# Patient Record
Sex: FEMALE | Race: WHITE | Employment: FULL TIME | URBAN - METROPOLITAN AREA
[De-identification: names, ages, dates, MRNs, and addresses within clinical notes are randomized per-mention and may not be internally consistent; named-entity substitution may affect disease eponyms.]

---

## 2018-01-22 ENCOUNTER — HOSPITAL ENCOUNTER (OUTPATIENT)
Dept: MAMMOGRAPHY | Age: 63
Discharge: HOME | End: 2018-01-22
Attending: FAMILY MEDICINE
Payer: COMMERCIAL

## 2018-01-22 DIAGNOSIS — Z12.39 SCREENING BREAST EXAMINATION: ICD-10-CM

## 2018-01-22 PROCEDURE — 77067 SCR MAMMO BI INCL CAD: CPT

## 2019-02-21 ENCOUNTER — HOSPITAL ENCOUNTER (OUTPATIENT)
Dept: MAMMOGRAPHY | Age: 64
Discharge: HOME | End: 2019-02-21
Attending: FAMILY MEDICINE
Payer: COMMERCIAL

## 2019-02-21 DIAGNOSIS — Z12.39 SCREENING BREAST EXAMINATION: ICD-10-CM

## 2019-02-21 PROCEDURE — 77067 SCR MAMMO BI INCL CAD: CPT

## 2020-12-19 ENCOUNTER — APPOINTMENT (OUTPATIENT)
Dept: GENERAL RADIOLOGY | Age: 65
End: 2020-12-19
Attending: EMERGENCY MEDICINE
Payer: COMMERCIAL

## 2020-12-19 ENCOUNTER — HOSPITAL ENCOUNTER (EMERGENCY)
Age: 65
Discharge: HOME | End: 2020-12-19
Attending: EMERGENCY MEDICINE
Payer: COMMERCIAL

## 2020-12-19 VITALS
OXYGEN SATURATION: 97 % | DIASTOLIC BLOOD PRESSURE: 76 MMHG | BODY MASS INDEX: 38.87 KG/M2 | HEIGHT: 60 IN | WEIGHT: 197.97 LBS | TEMPERATURE: 98 F | HEART RATE: 74 BPM | RESPIRATION RATE: 18 BRPM | SYSTOLIC BLOOD PRESSURE: 131 MMHG

## 2020-12-19 DIAGNOSIS — S80.11XA CONTUSION OF RIGHT LOWER LEG, INITIAL ENCOUNTER: Primary | ICD-10-CM

## 2020-12-19 PROCEDURE — 99283 EMERGENCY DEPT VISIT LOW MDM: CPT

## 2020-12-19 PROCEDURE — 73590 X-RAY EXAM OF LOWER LEG: CPT

## 2020-12-19 NOTE — ED PROVIDER NOTES
EMERGENCY DEPARTMENT HISTORY AND PHYSICAL EXAM      Date: 12/19/2020  Patient Name: Uvaldo Hamman    History of Presenting Illness     Chief Complaint   Patient presents with    Leg Injury     pt states one week ago she had a headboard of a bed fall on her RLE. Pt c/o large hematoma to lateral and posterior RLE. Pt has significant bruising and moderate swelling to RLE         HPI: Uvaldo Hamman, 60 y.o. female presents to the ED with cc of right lower extremity bruising and pain. She states that about a week ago she hit this on the headboard, since then she has developed bruising and swelling. It has not gotten better. She has been ambulating normally with no pain. She is primarily concerned about a DVT. There are no other complaints, changes, or physical findings at this time. PCP: Tara Wing MD    No current facility-administered medications on file prior to encounter. No current outpatient medications on file prior to encounter. Past History     Past Medical History:  History reviewed. No pertinent past medical history. Past Surgical History:  Past Surgical History:   Procedure Laterality Date    HX CHOLECYSTECTOMY      HX MASTECTOMY         Family History:  History reviewed. No pertinent family history. Social History:  Social History     Tobacco Use    Smoking status: Never Smoker    Smokeless tobacco: Never Used   Substance Use Topics    Alcohol use: Never     Frequency: Never    Drug use: Never       Allergies: Allergies   Allergen Reactions    Bactrim [Sulfamethoprim] Rash    Codeine Nausea and Vomiting    Pcn [Penicillins] Rash         Review of Systems   Review of Systems   Constitutional: Negative for chills and fatigue. Respiratory: Negative for cough and shortness of breath. Cardiovascular: Negative for chest pain. Musculoskeletal: Negative for gait problem. Neurological: Negative for weakness and numbness.    All other systems reviewed and are negative. Physical Exam   Physical Exam  Vitals signs and nursing note reviewed. Constitutional:       Appearance: Normal appearance. Musculoskeletal:      Comments: RLE with bruising distally to medial side sup to achilles and lateral to tibia extending posteriorly, approx 5 in diameter. No erythema/warmth. Underlying induration noted. Achilles intact w/o pain. No bony knee/ankle/foot pain. 2+ DP/PT pulses. SILT, toes up/down. Neurological:      Mental Status: She is alert. Diagnostic Study Results     Labs -   No results found for this or any previous visit (from the past 24 hour(s)). Radiologic Studies -   XR TIB/FIB RT   Final Result   IMPRESSION: Posterior soft tissue edema without acute fracture. CT Results  (Last 48 hours)    None        CXR Results  (Last 48 hours)    None            Medical Decision Making   I am the first provider for this patient. I reviewed the vital signs, available nursing notes, past medical history, past surgical history, family history and social history. Vital Signs-Reviewed the patient's vital signs. Patient Vitals for the past 24 hrs:   Temp Pulse Resp BP SpO2   12/19/20 1200    131/76 97 %   12/19/20 1122     97 %   12/19/20 1114 98 °F (36.7 °C) 74 18 (!) 145/84 100 %         Provider Notes (Medical Decision Making):   Very pleasant, well-appearing 67-year-old presenting to ED about a week after blunt trauma to the posteromedial aspect superior to the ankle. Most likely etiology is bruising with underlying hematoma. There is no overlying warmth or erythema suggestive of cellulitis. There is no circumferential swelling suggestive of a DVT. No bony tenderness. X-ray done and no fractures, tumors or other abnormalities noted. She is neurovascularly intact and ambulating well. I think she safe for discharge at this time. Discussed symptomatic management and follow-up with primary care for further evaluation.   Return precautions given.    ED Course:     Initial assessment performed. The patients presenting problems have been discussed, and they are in agreement with the care plan formulated and outlined with them. I have encouraged them to ask questions as they arise throughout their visit. Disposition:  dc    PLAN:  1. There are no discharge medications for this patient.     2.   Follow-up Information    None       Return to ED if worse     Diagnosis     Clinical Impression: contusion

## 2020-12-19 NOTE — ED NOTES
Dr. Gail Galeazzi at bedside evaluating pt. Pt resting on stretcher in POC with call bell in reach. Pt remains on monitor x 2. VSS at this time. 1239: Pt discharged by Dr. Gail Galeazzi. Pt provided with discharge instructions Rx and instructions on follow up care. Pt out of ED ambulatory.

## 2021-03-18 ENCOUNTER — TRANSCRIBE ORDER (OUTPATIENT)
Dept: SCHEDULING | Age: 66
End: 2021-03-18

## 2021-03-18 DIAGNOSIS — R91.8 MULTIPLE PULMONARY NODULES: Primary | ICD-10-CM

## 2021-05-08 ENCOUNTER — HOSPITAL ENCOUNTER (EMERGENCY)
Age: 66
Discharge: HOME | End: 2021-05-08
Attending: EMERGENCY MEDICINE
Payer: COMMERCIAL

## 2021-05-08 ENCOUNTER — APPOINTMENT (OUTPATIENT)
Dept: GENERAL RADIOLOGY | Age: 66
End: 2021-05-08
Attending: EMERGENCY MEDICINE
Payer: COMMERCIAL

## 2021-05-08 VITALS
DIASTOLIC BLOOD PRESSURE: 71 MMHG | WEIGHT: 205 LBS | SYSTOLIC BLOOD PRESSURE: 127 MMHG | BODY MASS INDEX: 40.25 KG/M2 | RESPIRATION RATE: 17 BRPM | HEART RATE: 73 BPM | TEMPERATURE: 98.6 F | HEIGHT: 60 IN | OXYGEN SATURATION: 96 %

## 2021-05-08 DIAGNOSIS — R07.9 CHEST PAIN, UNSPECIFIED TYPE: Primary | ICD-10-CM

## 2021-05-08 LAB
ALBUMIN SERPL-MCNC: 4 G/DL (ref 3.5–5)
ALBUMIN/GLOB SERPL: 1.1 {RATIO} (ref 1.1–2.2)
ALP SERPL-CCNC: 94 U/L (ref 45–117)
ALT SERPL-CCNC: 28 U/L (ref 12–78)
ANION GAP SERPL CALC-SCNC: 5 MMOL/L (ref 5–15)
AST SERPL-CCNC: 9 U/L (ref 15–37)
BASOPHILS # BLD: 0 K/UL (ref 0–0.1)
BASOPHILS NFR BLD: 1 % (ref 0–1)
BILIRUB SERPL-MCNC: 0.3 MG/DL (ref 0.2–1)
BUN SERPL-MCNC: 19 MG/DL (ref 6–20)
BUN/CREAT SERPL: 19 (ref 12–20)
CALCIUM SERPL-MCNC: 9.6 MG/DL (ref 8.5–10.1)
CHLORIDE SERPL-SCNC: 108 MMOL/L (ref 97–108)
CO2 SERPL-SCNC: 27 MMOL/L (ref 21–32)
CREAT SERPL-MCNC: 0.98 MG/DL (ref 0.55–1.02)
D DIMER PPP FEU-MCNC: 0.49 MG/L FEU (ref 0–0.65)
DIFFERENTIAL METHOD BLD: NORMAL
EOSINOPHIL # BLD: 0.2 K/UL (ref 0–0.4)
EOSINOPHIL NFR BLD: 3 % (ref 0–7)
ERYTHROCYTE [DISTWIDTH] IN BLOOD BY AUTOMATED COUNT: 12.9 % (ref 11.5–14.5)
GLOBULIN SER CALC-MCNC: 3.6 G/DL (ref 2–4)
GLUCOSE SERPL-MCNC: 115 MG/DL (ref 65–100)
HCT VFR BLD AUTO: 45.3 % (ref 35–47)
HGB BLD-MCNC: 14.3 G/DL (ref 11.5–16)
IMM GRANULOCYTES # BLD AUTO: 0 K/UL (ref 0–0.04)
IMM GRANULOCYTES NFR BLD AUTO: 0 % (ref 0–0.5)
LYMPHOCYTES # BLD: 1.5 K/UL (ref 0.8–3.5)
LYMPHOCYTES NFR BLD: 21 % (ref 12–49)
MCH RBC QN AUTO: 29.2 PG (ref 26–34)
MCHC RBC AUTO-ENTMCNC: 31.6 G/DL (ref 30–36.5)
MCV RBC AUTO: 92.4 FL (ref 80–99)
MONOCYTES # BLD: 0.4 K/UL (ref 0–1)
MONOCYTES NFR BLD: 6 % (ref 5–13)
NEUTS SEG # BLD: 5.1 K/UL (ref 1.8–8)
NEUTS SEG NFR BLD: 69 % (ref 32–75)
NRBC # BLD: 0 K/UL (ref 0–0.01)
NRBC BLD-RTO: 0 PER 100 WBC
PLATELET # BLD AUTO: 397 K/UL (ref 150–400)
PMV BLD AUTO: 9.4 FL (ref 8.9–12.9)
POTASSIUM SERPL-SCNC: 3.6 MMOL/L (ref 3.5–5.1)
PROT SERPL-MCNC: 7.6 G/DL (ref 6.4–8.2)
RBC # BLD AUTO: 4.9 M/UL (ref 3.8–5.2)
SODIUM SERPL-SCNC: 140 MMOL/L (ref 136–145)
TROPONIN I SERPL-MCNC: <0.05 NG/ML
TROPONIN I SERPL-MCNC: <0.05 NG/ML
WBC # BLD AUTO: 7.3 K/UL (ref 3.6–11)

## 2021-05-08 PROCEDURE — 85025 COMPLETE CBC W/AUTO DIFF WBC: CPT

## 2021-05-08 PROCEDURE — 99285 EMERGENCY DEPT VISIT HI MDM: CPT

## 2021-05-08 PROCEDURE — 80053 COMPREHEN METABOLIC PANEL: CPT

## 2021-05-08 PROCEDURE — 93005 ELECTROCARDIOGRAM TRACING: CPT

## 2021-05-08 PROCEDURE — 84484 ASSAY OF TROPONIN QUANT: CPT

## 2021-05-08 PROCEDURE — 71045 X-RAY EXAM CHEST 1 VIEW: CPT

## 2021-05-08 PROCEDURE — 36415 COLL VENOUS BLD VENIPUNCTURE: CPT

## 2021-05-08 PROCEDURE — 85379 FIBRIN DEGRADATION QUANT: CPT

## 2021-05-09 LAB
ATRIAL RATE: 87 BPM
CALCULATED P AXIS, ECG09: 22 DEGREES
CALCULATED R AXIS, ECG10: -8 DEGREES
CALCULATED T AXIS, ECG11: 5 DEGREES
DIAGNOSIS, 93000: NORMAL
P-R INTERVAL, ECG05: 142 MS
Q-T INTERVAL, ECG07: 392 MS
QRS DURATION, ECG06: 134 MS
QTC CALCULATION (BEZET), ECG08: 471 MS
VENTRICULAR RATE, ECG03: 87 BPM

## 2021-05-09 NOTE — ED NOTES
Bedside shift change report given to [id number] (oncoming nurse) by Meghna Fung (offgoing nurse). Report included the following information SBAR, Kardex, ED Summary, Intake/Output, MAR, Recent Results and Med Rec Status. 1930- repeat trop drawn. 2017- I have reviewed discharge instructions with the patient. The patient verbalized understanding. Pt ambulatory.

## 2021-05-09 NOTE — ED PROVIDER NOTES
EMERGENCY DEPARTMENT HISTORY AND PHYSICAL EXAM      Date: 5/8/2021  Patient Name: Sanna Wiley    History of Presenting Illness     Chief Complaint   Patient presents with    Chest Pain     Pain at left chest intermittent for 1 week, worse today with some SOB and dizziness. HPI: Sanna Wiley, 60 y.o. female presents to the ED with cc of chest pain. Ongoing for some time. Normally only has it when she is at work. L sided w radiation to LUE. Not assoc w exertion or rest, she thinks it's assoc w stress. She came in because she wasn't at work today but had the pain. No pain currently. There are no other complaints, changes, or physical findings at this time. PCP: Anastasiia Waters MD    No current facility-administered medications on file prior to encounter. No current outpatient medications on file prior to encounter. Past History     Past Medical History:  No past medical history on file. Past Surgical History:  Past Surgical History:   Procedure Laterality Date    HX CHOLECYSTECTOMY      HX MASTECTOMY         Family History:  No family history on file. Social History:  Social History     Tobacco Use    Smoking status: Never Smoker    Smokeless tobacco: Never Used   Substance Use Topics    Alcohol use: Never     Frequency: Never    Drug use: Never       Allergies: Allergies   Allergen Reactions    Bactrim [Sulfamethoprim] Rash    Codeine Nausea and Vomiting    Pcn [Penicillins] Rash         Review of Systems   Review of Systems   Constitutional: Negative for chills and fever. HENT: Negative for rhinorrhea. Eyes: Negative for redness. Respiratory: Negative for shortness of breath. Cardiovascular: Positive for chest pain. Gastrointestinal: Negative for abdominal pain. Genitourinary: Negative for dysuria. Musculoskeletal: Negative for back pain. Neurological: Negative for syncope. Psychiatric/Behavioral: The patient is not nervous/anxious.     All other systems reviewed and are negative. Physical Exam   Physical Exam  Vitals signs and nursing note reviewed. Constitutional:       Appearance: Normal appearance. HENT:      Head: Normocephalic and atraumatic. Mouth/Throat:      Mouth: Mucous membranes are moist.   Eyes:      Extraocular Movements: Extraocular movements intact. Neck:      Musculoskeletal: Neck supple. Cardiovascular:      Rate and Rhythm: Normal rate and regular rhythm. Pulses: Normal pulses. Pulmonary:      Effort: Pulmonary effort is normal.      Breath sounds: Normal breath sounds. Abdominal:      Palpations: Abdomen is soft. Tenderness: There is no abdominal tenderness. Musculoskeletal:         General: No deformity. Skin:     General: Skin is warm and dry. Neurological:      General: No focal deficit present. Mental Status: She is alert.    Psychiatric:         Mood and Affect: Mood normal.         Behavior: Behavior normal.         Diagnostic Study Results     Labs -     Recent Results (from the past 24 hour(s))   EKG, 12 LEAD, INITIAL    Collection Time: 05/08/21  4:21 PM   Result Value Ref Range    Ventricular Rate 87 BPM    Atrial Rate 87 BPM    P-R Interval 142 ms    QRS Duration 134 ms    Q-T Interval 392 ms    QTC Calculation (Bezet) 471 ms    Calculated P Axis 22 degrees    Calculated R Axis -8 degrees    Calculated T Axis 5 degrees    Diagnosis       Normal sinus rhythm  Right bundle branch block  When compared with ECG of 06-APR-2002 18:26,  Right bundle branch block is now present     CBC WITH AUTOMATED DIFF    Collection Time: 05/08/21  4:31 PM   Result Value Ref Range    WBC 7.3 3.6 - 11.0 K/uL    RBC 4.90 3.80 - 5.20 M/uL    HGB 14.3 11.5 - 16.0 g/dL    HCT 45.3 35.0 - 47.0 %    MCV 92.4 80.0 - 99.0 FL    MCH 29.2 26.0 - 34.0 PG    MCHC 31.6 30.0 - 36.5 g/dL    RDW 12.9 11.5 - 14.5 %    PLATELET 971 179 - 365 K/uL    MPV 9.4 8.9 - 12.9 FL    NRBC 0.0 0  WBC    ABSOLUTE NRBC 0.00 0.00 - 0.01 K/uL    NEUTROPHILS 69 32 - 75 %    LYMPHOCYTES 21 12 - 49 %    MONOCYTES 6 5 - 13 %    EOSINOPHILS 3 0 - 7 %    BASOPHILS 1 0 - 1 %    IMMATURE GRANULOCYTES 0 0.0 - 0.5 %    ABS. NEUTROPHILS 5.1 1.8 - 8.0 K/UL    ABS. LYMPHOCYTES 1.5 0.8 - 3.5 K/UL    ABS. MONOCYTES 0.4 0.0 - 1.0 K/UL    ABS. EOSINOPHILS 0.2 0.0 - 0.4 K/UL    ABS. BASOPHILS 0.0 0.0 - 0.1 K/UL    ABS. IMM. GRANS. 0.0 0.00 - 0.04 K/UL    DF AUTOMATED     METABOLIC PANEL, COMPREHENSIVE    Collection Time: 05/08/21  4:31 PM   Result Value Ref Range    Sodium 140 136 - 145 mmol/L    Potassium 3.6 3.5 - 5.1 mmol/L    Chloride 108 97 - 108 mmol/L    CO2 27 21 - 32 mmol/L    Anion gap 5 5 - 15 mmol/L    Glucose 115 (H) 65 - 100 mg/dL    BUN 19 6 - 20 MG/DL    Creatinine 0.98 0.55 - 1.02 MG/DL    BUN/Creatinine ratio 19 12 - 20      GFR est AA >60 >60 ml/min/1.73m2    GFR est non-AA 57 (L) >60 ml/min/1.73m2    Calcium 9.6 8.5 - 10.1 MG/DL    Bilirubin, total 0.3 0.2 - 1.0 MG/DL    ALT (SGPT) 28 12 - 78 U/L    AST (SGOT) 9 (L) 15 - 37 U/L    Alk. phosphatase 94 45 - 117 U/L    Protein, total 7.6 6.4 - 8.2 g/dL    Albumin 4.0 3.5 - 5.0 g/dL    Globulin 3.6 2.0 - 4.0 g/dL    A-G Ratio 1.1 1.1 - 2.2     TROPONIN I    Collection Time: 05/08/21  4:31 PM   Result Value Ref Range    Troponin-I, Qt. <0.05 <0.05 ng/mL   D DIMER    Collection Time: 05/08/21  5:30 PM   Result Value Ref Range    D-dimer 0.49 0.00 - 0.65 mg/L FEU   TROPONIN I    Collection Time: 05/08/21  7:25 PM   Result Value Ref Range    Troponin-I, Qt. <0.05 <0.05 ng/mL       Radiologic Studies -   XR CHEST PORT   Final Result   No acute findings        CT Results  (Last 48 hours)    None        CXR Results  (Last 48 hours)               05/08/21 1648  XR CHEST PORT Final result    Impression:  No acute findings       Narrative:  EXAM: XR CHEST PORT       INDICATION: chest pain       COMPARISON: None. FINDINGS: A portable AP radiograph of the chest was obtained at 1635 hours. There is no pneumothorax or pleural effusion. The lungs are clear. The cardiac   and mediastinal contours and pulmonary vascularity are normal. No hilar   enlargement is shown. The bones and soft tissues are grossly within normal   limits. Medical Decision Making   I am the first provider for this patient. I reviewed the vital signs, available nursing notes, past medical history, past surgical history, family history and social history. Vital Signs-Reviewed the patient's vital signs. Patient Vitals for the past 24 hrs:   Temp Pulse Resp BP SpO2   05/08/21 1845  73 17 127/71 96 %   05/08/21 1830  71 18 127/63 96 %   05/08/21 1815  75 15 (!) 150/73 95 %   05/08/21 1730  80 20 (!) 157/74 95 %   05/08/21 1715  80 13 (!) 150/63 94 %   05/08/21 1700  87 12 (!) 156/93 96 %   05/08/21 1645  81 17 (!) 178/82 96 %   05/08/21 1643     98 %   05/08/21 1638 98.6 °F (37 °C) 86 20 (!) 162/78 97 %         Provider Notes (Medical Decision Making):   Pleasant 61 yo lady pw L sided CP w radiation to LUE. EKG w no ischemia and trops neg x 2, does not sound like ACS. Dimer neg, PE unlikely. CXR clear w no pnm or PTX. Labs otherwise reassuring. Ultimately, I think pt is safe for dc at this time w plan to follow up with cardiology for further evaluation. Close return precautions given. EKG sinus, rate normal, nonspecific ST T changes, normal QT    ED Course:     Initial assessment performed. The patients presenting problems have been discussed, and they are in agreement with the care plan formulated and outlined with them. I have encouraged them to ask questions as they arise throughout their visit. Disposition:  dc    PLAN:  1. There are no discharge medications for this patient.     2.   Follow-up Information     Follow up With Specialties Details Why Contact Info    Christy Jackson MD Cardiology   [place] [address]  [address]  [phone]          Return to ED if worse Diagnosis     Clinical Impression: chest pain

## 2021-05-11 ENCOUNTER — HOSPITAL ENCOUNTER (OUTPATIENT)
Dept: CT IMAGING | Age: 66
Discharge: HOME | End: 2021-05-11
Attending: INTERNAL MEDICINE
Payer: COMMERCIAL

## 2021-05-11 DIAGNOSIS — R91.8 MULTIPLE PULMONARY NODULES: ICD-10-CM

## 2021-05-11 PROCEDURE — 71250 CT THORAX DX C-: CPT

## 2021-08-02 ENCOUNTER — HOSPITAL ENCOUNTER (OUTPATIENT)
Dept: GENERAL RADIOLOGY | Age: 66
Discharge: HOME | End: 2021-08-02
Payer: COMMERCIAL

## 2021-08-02 ENCOUNTER — TRANSCRIBE ORDER (OUTPATIENT)
Dept: REGISTRATION | Age: 66
End: 2021-08-02

## 2021-08-02 DIAGNOSIS — R91.8 LUNG MASS: Primary | ICD-10-CM

## 2021-08-02 DIAGNOSIS — R91.8 LUNG MASS: ICD-10-CM

## 2021-08-02 PROCEDURE — 71046 X-RAY EXAM CHEST 2 VIEWS: CPT

## 2021-08-12 ENCOUNTER — TRANSCRIBE ORDER (OUTPATIENT)
Dept: SCHEDULING | Age: 66
End: 2021-08-12

## 2021-08-12 DIAGNOSIS — R91.8 MULTIPLE PULMONARY NODULES: Primary | ICD-10-CM

## 2022-02-23 ENCOUNTER — HOSPITAL ENCOUNTER (OUTPATIENT)
Dept: CT IMAGING | Age: 67
Discharge: HOME | End: 2022-02-23
Attending: INTERNAL MEDICINE
Payer: COMMERCIAL

## 2022-02-23 PROCEDURE — 71250 CT THORAX DX C-: CPT

## 2023-01-25 ENCOUNTER — HOSPITAL ENCOUNTER (OUTPATIENT)
Dept: GENERAL RADIOLOGY | Age: 68
Discharge: HOME | End: 2023-01-25
Payer: COMMERCIAL

## 2023-01-25 ENCOUNTER — TRANSCRIBE ORDER (OUTPATIENT)
Dept: REGISTRATION | Age: 68
End: 2023-01-25

## 2023-01-25 DIAGNOSIS — Z98.1 STATUS POST LUMBAR SPINAL FUSION: ICD-10-CM

## 2023-01-25 DIAGNOSIS — Z98.1 STATUS POST LUMBAR SPINAL FUSION: Primary | ICD-10-CM

## 2023-01-25 PROCEDURE — 72100 X-RAY EXAM L-S SPINE 2/3 VWS: CPT

## 2023-04-19 ENCOUNTER — TRANSCRIBE ORDER (OUTPATIENT)
Dept: REGISTRATION | Age: 68
End: 2023-04-19

## 2023-04-19 ENCOUNTER — HOSPITAL ENCOUNTER (OUTPATIENT)
Dept: GENERAL RADIOLOGY | Age: 68
Discharge: HOME | End: 2023-04-19
Payer: COMMERCIAL

## 2023-04-19 DIAGNOSIS — Z98.1 STATUS POST LUMBAR SPINAL FUSION: ICD-10-CM

## 2023-04-19 DIAGNOSIS — Z98.1 STATUS POST LUMBAR SPINAL FUSION: Primary | ICD-10-CM

## 2023-04-19 PROCEDURE — 72110 X-RAY EXAM L-2 SPINE 4/>VWS: CPT

## 2023-04-23 DIAGNOSIS — Z98.1 STATUS POST LUMBAR SPINAL FUSION: Primary | ICD-10-CM

## 2023-08-16 ENCOUNTER — HOSPITAL ENCOUNTER (OUTPATIENT)
Facility: HOSPITAL | Age: 68
Discharge: HOME | End: 2023-08-19
Payer: MEDICARE

## 2023-08-16 DIAGNOSIS — Z98.1 STATUS POST LUMBAR SPINAL FUSION: ICD-10-CM

## 2023-08-16 PROCEDURE — 72110 X-RAY EXAM L-2 SPINE 4/>VWS: CPT

## 2024-08-09 ENCOUNTER — APPOINTMENT (OUTPATIENT)
Facility: HOSPITAL | Age: 69
DRG: 149 | End: 2024-08-09
Payer: MEDICARE

## 2024-08-09 ENCOUNTER — HOSPITAL ENCOUNTER (INPATIENT)
Facility: HOSPITAL | Age: 69
LOS: 10 days | Discharge: HOME HEALTH | DRG: 149 | End: 2024-08-19
Attending: EMERGENCY MEDICINE | Admitting: STUDENT IN AN ORGANIZED HEALTH CARE EDUCATION/TRAINING PROGRAM
Payer: MEDICARE

## 2024-08-09 DIAGNOSIS — R42 DIZZINESS: Primary | ICD-10-CM

## 2024-08-09 DIAGNOSIS — R07.9 CHEST PAIN, UNSPECIFIED TYPE: ICD-10-CM

## 2024-08-09 LAB
ALBUMIN SERPL-MCNC: 3.8 G/DL (ref 3.5–5)
ALBUMIN/GLOB SERPL: 1.3 (ref 1.1–2.2)
ALP SERPL-CCNC: 74 U/L (ref 45–117)
ALT SERPL-CCNC: 23 U/L (ref 12–78)
ANION GAP SERPL CALC-SCNC: 4 MMOL/L (ref 5–15)
AST SERPL-CCNC: 14 U/L (ref 15–37)
BASOPHILS # BLD: 0.1 K/UL (ref 0–0.1)
BASOPHILS NFR BLD: 1 % (ref 0–1)
BILIRUB SERPL-MCNC: 0.3 MG/DL (ref 0.2–1)
BUN SERPL-MCNC: 15 MG/DL (ref 6–20)
BUN/CREAT SERPL: 18 (ref 12–20)
CALCIUM SERPL-MCNC: 9.4 MG/DL (ref 8.5–10.1)
CHLORIDE SERPL-SCNC: 104 MMOL/L (ref 97–108)
CO2 SERPL-SCNC: 31 MMOL/L (ref 21–32)
CREAT SERPL-MCNC: 0.84 MG/DL (ref 0.55–1.02)
DIFFERENTIAL METHOD BLD: NORMAL
EOSINOPHIL # BLD: 0.2 K/UL (ref 0–0.4)
EOSINOPHIL NFR BLD: 3 % (ref 0–7)
ERYTHROCYTE [DISTWIDTH] IN BLOOD BY AUTOMATED COUNT: 12.9 % (ref 11.5–14.5)
GLOBULIN SER CALC-MCNC: 3 G/DL (ref 2–4)
GLUCOSE SERPL-MCNC: 104 MG/DL (ref 65–100)
HCT VFR BLD AUTO: 43.3 % (ref 35–47)
HGB BLD-MCNC: 13.7 G/DL (ref 11.5–16)
IMM GRANULOCYTES # BLD AUTO: 0 K/UL (ref 0–0.04)
IMM GRANULOCYTES NFR BLD AUTO: 0 % (ref 0–0.5)
LYMPHOCYTES # BLD: 1.3 K/UL (ref 0.8–3.5)
LYMPHOCYTES NFR BLD: 23 % (ref 12–49)
MCH RBC QN AUTO: 29 PG (ref 26–34)
MCHC RBC AUTO-ENTMCNC: 31.6 G/DL (ref 30–36.5)
MCV RBC AUTO: 91.7 FL (ref 80–99)
MONOCYTES # BLD: 0.5 K/UL (ref 0–1)
MONOCYTES NFR BLD: 9 % (ref 5–13)
NEUTS SEG # BLD: 3.7 K/UL (ref 1.8–8)
NEUTS SEG NFR BLD: 64 % (ref 32–75)
NRBC # BLD: 0 K/UL (ref 0–0.01)
NRBC BLD-RTO: 0 PER 100 WBC
PLATELET # BLD AUTO: 299 K/UL (ref 150–400)
PMV BLD AUTO: 9 FL (ref 8.9–12.9)
POTASSIUM SERPL-SCNC: 4.2 MMOL/L (ref 3.5–5.1)
PROT SERPL-MCNC: 6.8 G/DL (ref 6.4–8.2)
RBC # BLD AUTO: 4.72 M/UL (ref 3.8–5.2)
SODIUM SERPL-SCNC: 139 MMOL/L (ref 136–145)
TROPONIN I SERPL HS-MCNC: 4 NG/L (ref 0–51)
TROPONIN I SERPL HS-MCNC: 4 NG/L (ref 0–51)
WBC # BLD AUTO: 5.7 K/UL (ref 3.6–11)

## 2024-08-09 PROCEDURE — 2580000003 HC RX 258: Performed by: EMERGENCY MEDICINE

## 2024-08-09 PROCEDURE — 6360000004 HC RX CONTRAST MEDICATION: Performed by: RADIOLOGY

## 2024-08-09 PROCEDURE — 80053 COMPREHEN METABOLIC PANEL: CPT

## 2024-08-09 PROCEDURE — 6360000002 HC RX W HCPCS: Performed by: EMERGENCY MEDICINE

## 2024-08-09 PROCEDURE — 70498 CT ANGIOGRAPHY NECK: CPT

## 2024-08-09 PROCEDURE — 71045 X-RAY EXAM CHEST 1 VIEW: CPT

## 2024-08-09 PROCEDURE — 93005 ELECTROCARDIOGRAM TRACING: CPT | Performed by: EMERGENCY MEDICINE

## 2024-08-09 PROCEDURE — 96374 THER/PROPH/DIAG INJ IV PUSH: CPT

## 2024-08-09 PROCEDURE — 84484 ASSAY OF TROPONIN QUANT: CPT

## 2024-08-09 PROCEDURE — 85025 COMPLETE CBC W/AUTO DIFF WBC: CPT

## 2024-08-09 PROCEDURE — 99285 EMERGENCY DEPT VISIT HI MDM: CPT

## 2024-08-09 PROCEDURE — 36415 COLL VENOUS BLD VENIPUNCTURE: CPT

## 2024-08-09 PROCEDURE — 70450 CT HEAD/BRAIN W/O DYE: CPT

## 2024-08-09 PROCEDURE — 6370000000 HC RX 637 (ALT 250 FOR IP): Performed by: EMERGENCY MEDICINE

## 2024-08-09 PROCEDURE — 1100000003 HC PRIVATE W/ TELEMETRY

## 2024-08-09 PROCEDURE — 96375 TX/PRO/DX INJ NEW DRUG ADDON: CPT

## 2024-08-09 RX ORDER — TRAZODONE HYDROCHLORIDE 50 MG/1
50 TABLET ORAL NIGHTLY
Status: DISCONTINUED | OUTPATIENT
Start: 2024-08-10 | End: 2024-08-11

## 2024-08-09 RX ORDER — ACETAMINOPHEN 325 MG/1
650 TABLET ORAL EVERY 4 HOURS PRN
Status: DISCONTINUED | OUTPATIENT
Start: 2024-08-09 | End: 2024-08-19 | Stop reason: HOSPADM

## 2024-08-09 RX ORDER — ONDANSETRON 2 MG/ML
4 INJECTION INTRAMUSCULAR; INTRAVENOUS EVERY 6 HOURS PRN
Status: DISCONTINUED | OUTPATIENT
Start: 2024-08-09 | End: 2024-08-19 | Stop reason: HOSPADM

## 2024-08-09 RX ORDER — ACETAMINOPHEN 650 MG/1
650 SUPPOSITORY RECTAL EVERY 6 HOURS PRN
Status: DISCONTINUED | OUTPATIENT
Start: 2024-08-09 | End: 2024-08-19 | Stop reason: HOSPADM

## 2024-08-09 RX ORDER — SODIUM CHLORIDE 9 MG/ML
INJECTION, SOLUTION INTRAVENOUS PRN
Status: DISCONTINUED | OUTPATIENT
Start: 2024-08-09 | End: 2024-08-19 | Stop reason: HOSPADM

## 2024-08-09 RX ORDER — SODIUM CHLORIDE 0.9 % (FLUSH) 0.9 %
5-40 SYRINGE (ML) INJECTION PRN
Status: DISCONTINUED | OUTPATIENT
Start: 2024-08-09 | End: 2024-08-19 | Stop reason: HOSPADM

## 2024-08-09 RX ORDER — ONDANSETRON 4 MG/1
4 TABLET, ORALLY DISINTEGRATING ORAL EVERY 8 HOURS PRN
Status: DISCONTINUED | OUTPATIENT
Start: 2024-08-09 | End: 2024-08-19 | Stop reason: HOSPADM

## 2024-08-09 RX ORDER — PANTOPRAZOLE SODIUM 40 MG/1
40 TABLET, DELAYED RELEASE ORAL
Status: DISCONTINUED | OUTPATIENT
Start: 2024-08-10 | End: 2024-08-19 | Stop reason: HOSPADM

## 2024-08-09 RX ORDER — ENOXAPARIN SODIUM 100 MG/ML
40 INJECTION SUBCUTANEOUS DAILY
Status: DISCONTINUED | OUTPATIENT
Start: 2024-08-10 | End: 2024-08-18

## 2024-08-09 RX ORDER — ONDANSETRON 2 MG/ML
4 INJECTION INTRAMUSCULAR; INTRAVENOUS ONCE
Status: COMPLETED | OUTPATIENT
Start: 2024-08-09 | End: 2024-08-09

## 2024-08-09 RX ORDER — ASPIRIN 81 MG/1
81 TABLET, CHEWABLE ORAL DAILY
Status: DISCONTINUED | OUTPATIENT
Start: 2024-08-09 | End: 2024-08-19 | Stop reason: HOSPADM

## 2024-08-09 RX ORDER — DIAZEPAM 5 MG/ML
2.5 INJECTION, SOLUTION INTRAMUSCULAR; INTRAVENOUS ONCE
Status: COMPLETED | OUTPATIENT
Start: 2024-08-09 | End: 2024-08-09

## 2024-08-09 RX ORDER — SODIUM CHLORIDE 0.9 % (FLUSH) 0.9 %
5-40 SYRINGE (ML) INJECTION EVERY 12 HOURS SCHEDULED
Status: DISCONTINUED | OUTPATIENT
Start: 2024-08-09 | End: 2024-08-19 | Stop reason: HOSPADM

## 2024-08-09 RX ORDER — MECLIZINE HCL 12.5 MG/1
25 TABLET ORAL
Status: COMPLETED | OUTPATIENT
Start: 2024-08-09 | End: 2024-08-09

## 2024-08-09 RX ORDER — POLYETHYLENE GLYCOL 3350 17 G/17G
17 POWDER, FOR SOLUTION ORAL DAILY PRN
Status: DISCONTINUED | OUTPATIENT
Start: 2024-08-09 | End: 2024-08-19 | Stop reason: HOSPADM

## 2024-08-09 RX ORDER — 0.9 % SODIUM CHLORIDE 0.9 %
500 INTRAVENOUS SOLUTION INTRAVENOUS ONCE
Status: COMPLETED | OUTPATIENT
Start: 2024-08-09 | End: 2024-08-09

## 2024-08-09 RX ORDER — ACETAMINOPHEN 325 MG/1
650 TABLET ORAL EVERY 6 HOURS PRN
Status: DISCONTINUED | OUTPATIENT
Start: 2024-08-09 | End: 2024-08-19 | Stop reason: HOSPADM

## 2024-08-09 RX ADMIN — DIAZEPAM 2.5 MG: 5 INJECTION, SOLUTION INTRAMUSCULAR; INTRAVENOUS at 19:29

## 2024-08-09 RX ADMIN — MECLIZINE 25 MG: 12.5 TABLET ORAL at 17:32

## 2024-08-09 RX ADMIN — LIDOCAINE HYDROCHLORIDE 40 ML: 20 SOLUTION ORAL at 17:32

## 2024-08-09 RX ADMIN — ONDANSETRON 4 MG: 2 INJECTION INTRAMUSCULAR; INTRAVENOUS at 17:36

## 2024-08-09 RX ADMIN — IOPAMIDOL 100 ML: 755 INJECTION, SOLUTION INTRAVENOUS at 19:44

## 2024-08-09 RX ADMIN — SODIUM CHLORIDE 500 ML: 9 INJECTION, SOLUTION INTRAVENOUS at 17:35

## 2024-08-09 ASSESSMENT — PAIN - FUNCTIONAL ASSESSMENT: PAIN_FUNCTIONAL_ASSESSMENT: NONE - DENIES PAIN

## 2024-08-09 NOTE — ED PROVIDER NOTES
[facility] EMERGENCY DEPT  EMERGENCY DEPARTMENT ENCOUNTER       Pt Name: Lizeth Llamas  MRN: [id number]  Birthdate 1955  Date of evaluation: 8/9/2024  Provider: Phill Aguilar DO   PCP: Hugh Leory MD  Note Started: 6:00 PM EDT 8/9/24     CHIEF COMPLAINT       Chief Complaint   Patient presents with    Chest Pain     Chest pain that started today while sitting at home, got dizzy with N/V, called EMS, chest tightness currently, no other complaints, states the pain in the chest comes and goes since it started, pt. Able to complete full sentences, NAD, patient is in emotional distress. She states she had to put her dog of 13 years down very recently and she has been having a hard time.        HISTORY OF PRESENT ILLNESS: 1 or more elements      History From: patient, History limited by: none     Lizeth Llamas is a 69 y.o. female presents to the emergency department by EMS for the evaluation of chest pain.    Please See MDM for Additional Details of the HPI/PMH  Nursing Notes were all reviewed and agreed with or any disagreements were addressed in the HPI.     REVIEW OF SYSTEMS        Positives and Pertinent negatives as per HPI.    PAST HISTORY     Past Medical History:  No past medical history on file.    Past Surgical History:  Past Surgical History:   Procedure Laterality Date    CHOLECYSTECTOMY      MASTECTOMY         Family History:  No family history on file.    Social History:  Social History     Tobacco Use    Smoking status: Never    Smokeless tobacco: Never   Substance Use Topics    Alcohol use: Never    Drug use: Never       Allergies:  Allergies   Allergen Reactions    Codeine Nausea And Vomiting    Penicillins Rash    Sulfamethoxazole-Trimethoprim Rash       CURRENT MEDICATIONS      Previous Medications    No medications on file       SCREENINGS               No data recorded         PHYSICAL EXAM      ED Triage Vitals [08/09/24 1655]   Enc Vitals Group      /70      Pulse 79      Respirations 16

## 2024-08-09 NOTE — ED NOTES
Orthostatics performed, patient dizzy when sitting and even dizzier when standing. Patient states she feels like the room is spending. MD informed. We have new orders.

## 2024-08-10 ENCOUNTER — APPOINTMENT (OUTPATIENT)
Facility: HOSPITAL | Age: 69
DRG: 149 | End: 2024-08-10
Payer: MEDICARE

## 2024-08-10 LAB
ANION GAP SERPL CALC-SCNC: 4 MMOL/L (ref 5–15)
BASOPHILS # BLD: 0.1 K/UL (ref 0–0.1)
BASOPHILS NFR BLD: 1 % (ref 0–1)
BUN SERPL-MCNC: 11 MG/DL (ref 6–20)
BUN/CREAT SERPL: 14 (ref 12–20)
CALCIUM SERPL-MCNC: 8.6 MG/DL (ref 8.5–10.1)
CHLORIDE SERPL-SCNC: 106 MMOL/L (ref 97–108)
CO2 SERPL-SCNC: 30 MMOL/L (ref 21–32)
CREAT SERPL-MCNC: 0.8 MG/DL (ref 0.55–1.02)
D DIMER PPP FEU-MCNC: 0.35 MG/L FEU (ref 0–0.65)
DIFFERENTIAL METHOD BLD: NORMAL
EKG ATRIAL RATE: 76 BPM
EKG DIAGNOSIS: NORMAL
EKG P AXIS: 25 DEGREES
EKG P-R INTERVAL: 140 MS
EKG Q-T INTERVAL: 420 MS
EKG QRS DURATION: 126 MS
EKG QTC CALCULATION (BAZETT): 472 MS
EKG R AXIS: 3 DEGREES
EKG T AXIS: 13 DEGREES
EKG VENTRICULAR RATE: 76 BPM
EOSINOPHIL # BLD: 0.2 K/UL (ref 0–0.4)
EOSINOPHIL NFR BLD: 3 % (ref 0–7)
ERYTHROCYTE [DISTWIDTH] IN BLOOD BY AUTOMATED COUNT: 12.7 % (ref 11.5–14.5)
GLUCOSE SERPL-MCNC: 116 MG/DL (ref 65–100)
HCT VFR BLD AUTO: 44.3 % (ref 35–47)
HGB BLD-MCNC: 13.7 G/DL (ref 11.5–16)
IMM GRANULOCYTES # BLD AUTO: 0 K/UL (ref 0–0.04)
IMM GRANULOCYTES NFR BLD AUTO: 0 % (ref 0–0.5)
LYMPHOCYTES # BLD: 2.4 K/UL (ref 0.8–3.5)
LYMPHOCYTES NFR BLD: 40 % (ref 12–49)
MCH RBC QN AUTO: 28.9 PG (ref 26–34)
MCHC RBC AUTO-ENTMCNC: 30.9 G/DL (ref 30–36.5)
MCV RBC AUTO: 93.5 FL (ref 80–99)
MONOCYTES # BLD: 0.5 K/UL (ref 0–1)
MONOCYTES NFR BLD: 9 % (ref 5–13)
NEUTS SEG # BLD: 2.9 K/UL (ref 1.8–8)
NEUTS SEG NFR BLD: 47 % (ref 32–75)
NRBC # BLD: 0 K/UL (ref 0–0.01)
NRBC BLD-RTO: 0 PER 100 WBC
PLATELET # BLD AUTO: 290 K/UL (ref 150–400)
PMV BLD AUTO: 9.3 FL (ref 8.9–12.9)
POTASSIUM SERPL-SCNC: 3.6 MMOL/L (ref 3.5–5.1)
RBC # BLD AUTO: 4.74 M/UL (ref 3.8–5.2)
SODIUM SERPL-SCNC: 140 MMOL/L (ref 136–145)
T4 FREE SERPL-MCNC: 0.9 NG/DL (ref 0.8–1.5)
TROPONIN I SERPL HS-MCNC: 5 NG/L (ref 0–51)
TSH SERPL DL<=0.05 MIU/L-ACNC: 4.56 UIU/ML (ref 0.36–3.74)
WBC # BLD AUTO: 6.1 K/UL (ref 3.6–11)

## 2024-08-10 PROCEDURE — 97535 SELF CARE MNGMENT TRAINING: CPT

## 2024-08-10 PROCEDURE — 84439 ASSAY OF FREE THYROXINE: CPT

## 2024-08-10 PROCEDURE — 1100000003 HC PRIVATE W/ TELEMETRY

## 2024-08-10 PROCEDURE — 85379 FIBRIN DEGRADATION QUANT: CPT

## 2024-08-10 PROCEDURE — 97165 OT EVAL LOW COMPLEX 30 MIN: CPT

## 2024-08-10 PROCEDURE — 70551 MRI BRAIN STEM W/O DYE: CPT

## 2024-08-10 PROCEDURE — 97530 THERAPEUTIC ACTIVITIES: CPT

## 2024-08-10 PROCEDURE — 6370000000 HC RX 637 (ALT 250 FOR IP): Performed by: EMERGENCY MEDICINE

## 2024-08-10 PROCEDURE — 6370000000 HC RX 637 (ALT 250 FOR IP): Performed by: STUDENT IN AN ORGANIZED HEALTH CARE EDUCATION/TRAINING PROGRAM

## 2024-08-10 PROCEDURE — 6370000000 HC RX 637 (ALT 250 FOR IP): Performed by: INTERNAL MEDICINE

## 2024-08-10 PROCEDURE — 36415 COLL VENOUS BLD VENIPUNCTURE: CPT

## 2024-08-10 PROCEDURE — 99222 1ST HOSP IP/OBS MODERATE 55: CPT | Performed by: PSYCHIATRY & NEUROLOGY

## 2024-08-10 PROCEDURE — 97162 PT EVAL MOD COMPLEX 30 MIN: CPT

## 2024-08-10 PROCEDURE — 6360000002 HC RX W HCPCS: Performed by: STUDENT IN AN ORGANIZED HEALTH CARE EDUCATION/TRAINING PROGRAM

## 2024-08-10 PROCEDURE — 80048 BASIC METABOLIC PNL TOTAL CA: CPT

## 2024-08-10 PROCEDURE — 2580000003 HC RX 258: Performed by: STUDENT IN AN ORGANIZED HEALTH CARE EDUCATION/TRAINING PROGRAM

## 2024-08-10 PROCEDURE — 84484 ASSAY OF TROPONIN QUANT: CPT

## 2024-08-10 PROCEDURE — 97116 GAIT TRAINING THERAPY: CPT

## 2024-08-10 PROCEDURE — 84443 ASSAY THYROID STIM HORMONE: CPT

## 2024-08-10 PROCEDURE — 85025 COMPLETE CBC W/AUTO DIFF WBC: CPT

## 2024-08-10 RX ORDER — VENLAFAXINE HYDROCHLORIDE 150 MG/1
150 CAPSULE, EXTENDED RELEASE ORAL DAILY
COMMUNITY

## 2024-08-10 RX ORDER — TRAZODONE HYDROCHLORIDE 50 MG/1
50 TABLET ORAL NIGHTLY
COMMUNITY

## 2024-08-10 RX ORDER — VENLAFAXINE 37.5 MG/1
37.5 TABLET ORAL DAILY
COMMUNITY

## 2024-08-10 RX ORDER — MECLIZINE HCL 12.5 MG/1
12.5 TABLET ORAL 3 TIMES DAILY PRN
Status: DISCONTINUED | OUTPATIENT
Start: 2024-08-10 | End: 2024-08-19 | Stop reason: HOSPADM

## 2024-08-10 RX ORDER — BUPROPION HYDROCHLORIDE 300 MG/1
300 TABLET ORAL EVERY MORNING
COMMUNITY

## 2024-08-10 RX ORDER — POTASSIUM CHLORIDE 20 MEQ/1
40 TABLET, EXTENDED RELEASE ORAL ONCE
Status: COMPLETED | OUTPATIENT
Start: 2024-08-10 | End: 2024-08-10

## 2024-08-10 RX ORDER — GABAPENTIN 100 MG/1
100 CAPSULE ORAL 2 TIMES DAILY
COMMUNITY

## 2024-08-10 RX ORDER — CETIRIZINE HYDROCHLORIDE 10 MG/1
10 TABLET ORAL DAILY
COMMUNITY

## 2024-08-10 RX ORDER — ANASTROZOLE 1 MG/1
1 TABLET ORAL DAILY
COMMUNITY

## 2024-08-10 RX ORDER — HYDROCHLOROTHIAZIDE 25 MG/1
12.5 TABLET ORAL DAILY
COMMUNITY

## 2024-08-10 RX ORDER — ACETAMINOPHEN 500 MG
1000 TABLET ORAL 2 TIMES DAILY
COMMUNITY

## 2024-08-10 RX ORDER — MECLIZINE HCL 12.5 MG/1
12.5 TABLET ORAL ONCE
Status: COMPLETED | OUTPATIENT
Start: 2024-08-10 | End: 2024-08-10

## 2024-08-10 RX ADMIN — MECLIZINE 12.5 MG: 12.5 TABLET ORAL at 00:20

## 2024-08-10 RX ADMIN — MECLIZINE 12.5 MG: 12.5 TABLET ORAL at 15:38

## 2024-08-10 RX ADMIN — SODIUM CHLORIDE, PRESERVATIVE FREE 10 ML: 5 INJECTION INTRAVENOUS at 10:01

## 2024-08-10 RX ADMIN — ACETAMINOPHEN 650 MG: 325 TABLET ORAL at 21:05

## 2024-08-10 RX ADMIN — MECLIZINE 12.5 MG: 12.5 TABLET ORAL at 09:59

## 2024-08-10 RX ADMIN — ACETAMINOPHEN 650 MG: 325 TABLET ORAL at 09:59

## 2024-08-10 RX ADMIN — TRAZODONE HYDROCHLORIDE 50 MG: 50 TABLET ORAL at 21:05

## 2024-08-10 RX ADMIN — PANTOPRAZOLE SODIUM 40 MG: 40 TABLET, DELAYED RELEASE ORAL at 11:59

## 2024-08-10 RX ADMIN — ASPIRIN 81 MG: 81 TABLET, CHEWABLE ORAL at 09:59

## 2024-08-10 RX ADMIN — ACETAMINOPHEN 650 MG: 325 TABLET ORAL at 00:19

## 2024-08-10 RX ADMIN — TRAZODONE HYDROCHLORIDE 50 MG: 50 TABLET ORAL at 00:20

## 2024-08-10 RX ADMIN — ENOXAPARIN SODIUM 40 MG: 100 INJECTION SUBCUTANEOUS at 09:58

## 2024-08-10 RX ADMIN — SODIUM CHLORIDE, PRESERVATIVE FREE 10 ML: 5 INJECTION INTRAVENOUS at 21:06

## 2024-08-10 RX ADMIN — POTASSIUM CHLORIDE 40 MEQ: 1500 TABLET, EXTENDED RELEASE ORAL at 11:59

## 2024-08-10 RX ADMIN — SODIUM CHLORIDE, PRESERVATIVE FREE 10 ML: 5 INJECTION INTRAVENOUS at 00:22

## 2024-08-10 ASSESSMENT — PAIN - FUNCTIONAL ASSESSMENT
PAIN_FUNCTIONAL_ASSESSMENT: PREVENTS OR INTERFERES SOME ACTIVE ACTIVITIES AND ADLS
PAIN_FUNCTIONAL_ASSESSMENT: PREVENTS OR INTERFERES SOME ACTIVE ACTIVITIES AND ADLS

## 2024-08-10 ASSESSMENT — PAIN DESCRIPTION - ONSET
ONSET: GRADUAL
ONSET: ON-GOING

## 2024-08-10 ASSESSMENT — PAIN DESCRIPTION - PAIN TYPE
TYPE: ACUTE PAIN
TYPE: ACUTE PAIN

## 2024-08-10 ASSESSMENT — PAIN SCALES - WONG BAKER
WONGBAKER_NUMERICALRESPONSE: NO HURT
WONGBAKER_NUMERICALRESPONSE: NO HURT

## 2024-08-10 ASSESSMENT — PAIN DESCRIPTION - ORIENTATION
ORIENTATION: RIGHT;LEFT
ORIENTATION: LEFT

## 2024-08-10 ASSESSMENT — PAIN SCALES - GENERAL
PAINLEVEL_OUTOF10: 2
PAINLEVEL_OUTOF10: 0
PAINLEVEL_OUTOF10: 0
PAINLEVEL_OUTOF10: 4
PAINLEVEL_OUTOF10: 4
PAINLEVEL_OUTOF10: 7

## 2024-08-10 ASSESSMENT — PAIN DESCRIPTION - LOCATION
LOCATION: HEAD
LOCATION: HIP

## 2024-08-10 ASSESSMENT — PAIN DESCRIPTION - DESCRIPTORS
DESCRIPTORS: ACHING
DESCRIPTORS: ACHING;STABBING

## 2024-08-10 ASSESSMENT — PAIN DESCRIPTION - FREQUENCY
FREQUENCY: CONTINUOUS
FREQUENCY: INTERMITTENT

## 2024-08-10 NOTE — CONSULTS
CONSULT - Neurology      Name:  Lizeth Llamas       MRN: [id number]  Location: 128/01    Date: 8/10/2024  Time:  12:01 PM        Chief Complaint:   Chief Complaint   Patient presents with    Chest Pain     Chest pain that started today while sitting at home, got dizzy with N/V, called EMS, chest tightness currently, no other complaints, states the pain in the chest comes and goes since it started, pt. Able to complete full sentences, NAD, patient is in emotional distress. She states she had to put her dog of 13 years down very recently and she has been having a hard time.       HPI:  It is a great pleasure to see Lizeth Llamas, a 69 y.o. female today in the hospital . Briefly these are the events happened as per the chart and taken from the chart. The patient was doing fine and the symptoms started with dizzy while sitting at the edge of the bed . The symptoms fluctuated in the beginning. Chest pain lasted for around 10 minutes and slowly went away.  There were no aggravating and relieving factors.  The patient was brought to the emergency room for further evaluation.     PAST MEDICAL HISTORY:  No past medical history on file.  PAST SURGICAL HISTORY:    Past Surgical History:   Procedure Laterality Date    CHOLECYSTECTOMY      MASTECTOMY       FAMILY HISTORY:  No family history on file.  SOCIAL HISTORY:   Social History     Tobacco Use    Smoking status: Never    Smokeless tobacco: Never   Substance Use Topics    Alcohol use: Never      ALLERGIES:   Allergies   Allergen Reactions    Codeine Nausea And Vomiting    Penicillins Rash    Sulfamethoxazole-Trimethoprim Rash      HOME MEDICATIONS:  Prior to Admission medications    Not on File         CURRENT MEDICATIONS:  Note that completed medications (per the MAR) continue to display for 24 hours. Ordered medications to be given in the future also display.      I've reviewed the Past Medical History, Past Surgical History, Allergies, Medications, Family History

## 2024-08-10 NOTE — ED NOTES
Daughter is concerned that her mother is experiencing  a lot of depression. She states the patient has lost her  and her mother and had to put her dog down this week. When talking to the patient about this she is very tearful. She states she has a therapist and a counselor and she hasn't seen either recently. Patient states she has a med adjustment appointment next week. She was open to a anum to help her now and they were paged.

## 2024-08-10 NOTE — PROGRESS NOTES
Spiritual Health Assessment/Progress Note  [facility]    Spiritual/Emotional Needs,  , Life Adjustments, Adjustment to illness,      Name: Lizeth Llamas MRN: [id number]    Age: 69 y.o.     Sex: female   Language: English   Rastafari: Yarsanism   <principal problem not specified>     Date: 8/9/2024            Total Time Calculated: 46 min              Spiritual Assessment began in \[facility]\"" EMERGENCY DEPT        Referral/Consult From: Nurse   Encounter Overview/Reason: Spiritual/Emotional Needs  Service Provided For: Patient and family together    Juliana, Belief, Meaning:   Patient is connected with a juliana tradition or spiritual practice and has beliefs or practices that help with coping during difficult times  Family/Friends are connected with a juliana tradition or spiritual practice and have beliefs or practices that help with coping during difficult times      Importance and Influence:  Patient has spiritual/personal beliefs that influence decisions regarding their health  Family/Friends have spiritual/personal beliefs that influence decisions regarding the patient's health    Community:  Patient is connected with a spiritual community and feels well-supported. Support system includes: Children and Extended family  Family/Friends are connected with a spiritual community: and feel well-supported. Support system includes: Children, Friends, and Extended family    Assessment and Plan of Care:     Patient Interventions include: Facilitated expression of thoughts and feelings, Explored spiritual coping/struggle/distress and theological reflection, Affirmed coping skills/support systems, and Engaged in life review and/or legacy  Family/Friends Interventions include: Family/Friends Interventions, Explored spiritual coping/struggle/distress and theological reflection, Affirmed coping skills/support systems, and Engaged in life review and/or legacy    Patient Plan of Care: Spiritual Care available upon

## 2024-08-10 NOTE — DISCHARGE INSTRUCTIONS
All of your medications from before your hospitalization are the same EXCEPT:   Your new prescription for you to start is aspirin for stroke prevention. Please use lidoderm patches as needed for pain.  Please take all of your medications as prescribed. If prescribed any medications, please read all pharmacy instructions and inserts. Inform your doctor and pharmacist about all other medications and alternative therapies.  Please follow up with your PCP in 1-2 weeks to be reassessed after your hospital stay.  Please also follow up with neurology in 4-8 weeks for dizziness follow up.  If you start feeling any symptoms similar to what brought you into the hospital, please come back to the ED to be re-evaluated.

## 2024-08-10 NOTE — CONSULTS
Pt seen and examined    Full consult dictated    Recc Pharmacologic nuclear stress ( Lexiscan) - either inpt or outpt

## 2024-08-10 NOTE — PROGRESS NOTES
Hospitalist Progress Note    NAME:   Lizeth WOOD Farhad   : 1955   MRN: [id number]     Date/Time: 8/10/2024 8:42 AM  Patient PCP: Hugh Leroy MD    Estimated discharge date:   Barriers: MRI brain, echocardiogram, neurology clearance, cardiology clearance      Assessment / Plan:  Chest pain-etiology to be determined  Dizziness  Admit to medical telemetry  Troponin 4-4, EKG sinus rhythm, RBBB-no prior EKG for comparison  CT head and neck-no evidence of acute intracranial abnormality, no evidence of significant stenosis or aneurysm CTA head and neck  Chest x-ray no acute cardiopulmonary process  Chest pain nonreproducible on examination vs  possible anxiety versus GERD  Orthostatic in ED was negative after NS bolus  Ordered orthostatic in a.m.  Meclizine as needed for dizziness  Low risk for PE, ordered D-dimer  PT/OT eval  If persistent chest pain-May need to consider cardiology eval, patient follows up with Dr. Sandoval  Pharmacy consulted for medical reconciliation  8/10: Patient denies any chest pain currently.  Reports that her dizziness is persistent along with sweating.  I spoke with neurology Dr. Alcantara and he agrees with getting MRI of the brain.  Meanwhile her troponin trend is 4, 4 and 5 which is essentially normal.  Echocardiogram has been ordered.  Will also consult cardiology for further input.    History of bilateral mastectomy due to breast cancer  History of cholecystectomy  8/10: Resume home dose of anastrozole.      Medical Decision Making:   I personally reviewed labs: CBC, BMP, troponin  I personally reviewed imaging: Chest x-ray, CT head, CT angiogram brain and neck  I personally reviewed EKG:  Toxic drug monitoring: H&H while patient is on Lovenox  Discussed case with: Patient, RN, neurology Dr. Alcantara        Code Status: Full code  DVT Prophylaxis: Lovenox  GI Prophylaxis:    Subjective:     Chief Complaint / Reason for Physician Visit  \" Follow-up for chest pain, dizziness,

## 2024-08-10 NOTE — H&P
Hospitalist Admission Note    NAME:   Lizeth Llamas   : 1955   MRN: [id number]     Date/Time: 2024 11:51 PM    Patient PCP: Hugh Leroy MD    ______________________________________________________________________  Given the patient's current clinical presentation, I have a high level of concern for decompensation if discharged from the emergency department.  Complex decision making was performed, which includes reviewing the patient's available past medical records, laboratory results, and x-ray films.       My assessment of this patient's clinical condition and my plan of care is as follows.    Assessment / Plan:  Chest pain-etiology to be determined  Dizziness    Plan  Admit to medical telemetry  Troponin 4-4, EKG sinus rhythm, RBBB-no prior EKG for comparison  CT head and neck-no evidence of acute intracranial abnormality, no evidence of significant stenosis or aneurysm CTA head and neck  Chest x-ray no acute cardiopulmonary process  Chest pain nonreproducible on examination vs  possible anxiety versus GERD  Orthostatic in ED was negative after NS bolus  Ordered orthostatic in a.m.  Meclizine as needed for dizziness  Low risk for PE, ordered D-dimer  PT/OT eval  If persistent chest pain-May need to consider cardiology eval, patient follows up with Dr. Snadoval  Pharmacy consulted for medical reconciliation          History of bilateral mastectomy due to breast cancer  History of cholecystectomy            Medical Decision Making:   I personally reviewed labs: Yes  I personally reviewed imaging: Yes  I personally reviewed EKG: Sinus rhythm, right bundle branch block toxic drug monitoring:   Discussed case with: ED provider. After discussion I am in agreement that acuity of patient's medical condition necessitates hospital stay.      Code Status: Full code  DVT Prophylaxis: Lovenox  Baseline: Ambulates with walker  Subjective:   CHIEF COMPLAINT: Chest pain    HISTORY OF PRESENT ILLNESS:

## 2024-08-10 NOTE — PROGRESS NOTES
2230--report received from gus palma, rn, pt arrived to room  128, with 2 daughters, pt alert oriented x 4, c/o dizziness that has been consent, pt c/o intermittent chest discomfort that she relates to anxiety, pt asking for snacks, and would like to wear home cpap  pt also requesting tylenol, protonix, gabentin and trazodone as these are her night time meds, pt reminded to stay in bed, bed alarm on, purewick in place, dr main notified of medication request, per valeria, pharmacist, 2024 asprin can be cancelled    2330--after eating jello, crackers and having gingerale, pt denies nausea, pt still is dizzy, pt given prn antivert, also given trazadone and tylenol per orders, call bell in reach    0530--pt up to bathroom with standby assist, no complaints of dizziness this am, back to bed, eating crackers

## 2024-08-10 NOTE — PROGRESS NOTES
End of Shift Note    Bedside shift change report given to DEMARIO Agustin   (oncoming nurse) by DEMARIO Duong.        Shift worked:  days   Shift summary and any significant changes:     MRI completed.  Needs MRI read. Needs ECHO. NEURO/Cardio Consult called and seen.       Concerns for physician to address:  Results   Zone phone for oncoming shift:        Patient Information  Lizeth Llamas  69 y.o.  8/9/2024  4:36 PM by Shereen Ruiz MD. Lizeth Llamas was admitted from [facility]    Problem List  Patient Active Problem List    Diagnosis Date Noted    Dizziness 08/09/2024     No past medical history on file.    Core Measures:  CVA: yes  CHF: no  PNA: no    Activity:     Number times ambulated in hallways past shift: 0  Number of times OOB to chair past shift: 2    Cardiac:   Cardiac Monitoring: yes,     Access:   Current line(s): PIV    Respiratory:   O2 Device: None (Room air)    GI:  Last BM (including prior to admit): 08/08/24  Current diet:  ADULT DIET; Regular; Low Fat/Low Chol/High Fiber/2 gm Na  Tolerating current diet: Yes    Pain Management:   Patient states pain is manageable on current regimen: N/A    Skin:  Kan Scale Score: 19  Interventions: dual skin   Pressure injury: no    Patient Safety:  Fall Score: Gonzalez Total Score: 70  Interventions: stay with me program  Self-release roll belt: No  Dexterity to release roll belt: N/A   (must document dexterity  here by stating Yes or No here, otherwise this is a restraint and must follow restraint documentation policy.)    DVT prophylaxis:  DVT prophylaxis: meds    Active Consults:  IP CONSULT TO HOSPITALIST  IP CONSULT TO PHARMACY  IP CONSULT TO CARDIOLOGY  IP CONSULT TO NEUROLOGY    Length of Stay:  Expected LOS: 3  Actual LOS: 1    DEMARIO Duong

## 2024-08-10 NOTE — PROGRESS NOTES
-Please complete MRI History and Safety Screening Form.    - Patient cannot be scanned until this form is completed and reviewed in MRI to ensure patient is SAFE and eligible for MRI.  - CALL MRI when this has been successfully completed at [phone].

## 2024-08-11 ENCOUNTER — APPOINTMENT (OUTPATIENT)
Facility: HOSPITAL | Age: 69
DRG: 149 | End: 2024-08-11
Attending: INTERNAL MEDICINE
Payer: MEDICARE

## 2024-08-11 LAB
ANION GAP SERPL CALC-SCNC: 6 MMOL/L (ref 5–15)
BASOPHILS # BLD: 0.1 K/UL (ref 0–0.1)
BASOPHILS NFR BLD: 1 % (ref 0–1)
BUN SERPL-MCNC: 14 MG/DL (ref 6–20)
BUN/CREAT SERPL: 20 (ref 12–20)
CALCIUM SERPL-MCNC: 8.8 MG/DL (ref 8.5–10.1)
CHLORIDE SERPL-SCNC: 107 MMOL/L (ref 97–108)
CO2 SERPL-SCNC: 27 MMOL/L (ref 21–32)
CREAT SERPL-MCNC: 0.71 MG/DL (ref 0.55–1.02)
DIFFERENTIAL METHOD BLD: ABNORMAL
EOSINOPHIL # BLD: 0.4 K/UL (ref 0–0.4)
EOSINOPHIL NFR BLD: 5 % (ref 0–7)
ERYTHROCYTE [DISTWIDTH] IN BLOOD BY AUTOMATED COUNT: 12.9 % (ref 11.5–14.5)
GLUCOSE SERPL-MCNC: 108 MG/DL (ref 65–100)
HCT VFR BLD AUTO: 46.7 % (ref 35–47)
HGB BLD-MCNC: 14.5 G/DL (ref 11.5–16)
IMM GRANULOCYTES # BLD AUTO: 0 K/UL (ref 0–0.04)
IMM GRANULOCYTES NFR BLD AUTO: 1 % (ref 0–0.5)
LYMPHOCYTES # BLD: 2.9 K/UL (ref 0.8–3.5)
LYMPHOCYTES NFR BLD: 43 % (ref 12–49)
MAGNESIUM SERPL-MCNC: 2.9 MG/DL (ref 1.6–2.4)
MCH RBC QN AUTO: 29.1 PG (ref 26–34)
MCHC RBC AUTO-ENTMCNC: 31 G/DL (ref 30–36.5)
MCV RBC AUTO: 93.8 FL (ref 80–99)
MONOCYTES # BLD: 0.5 K/UL (ref 0–1)
MONOCYTES NFR BLD: 8 % (ref 5–13)
NEUTS SEG # BLD: 2.8 K/UL (ref 1.8–8)
NEUTS SEG NFR BLD: 42 % (ref 32–75)
NRBC # BLD: 0 K/UL (ref 0–0.01)
NRBC BLD-RTO: 0 PER 100 WBC
PHOSPHATE SERPL-MCNC: 2.6 MG/DL (ref 2.6–4.7)
PLATELET # BLD AUTO: 318 K/UL (ref 150–400)
PMV BLD AUTO: 9.6 FL (ref 8.9–12.9)
POTASSIUM SERPL-SCNC: 3.9 MMOL/L (ref 3.5–5.1)
RBC # BLD AUTO: 4.98 M/UL (ref 3.8–5.2)
SODIUM SERPL-SCNC: 140 MMOL/L (ref 136–145)
WBC # BLD AUTO: 6.6 K/UL (ref 3.6–11)

## 2024-08-11 PROCEDURE — 6360000002 HC RX W HCPCS: Performed by: STUDENT IN AN ORGANIZED HEALTH CARE EDUCATION/TRAINING PROGRAM

## 2024-08-11 PROCEDURE — 93308 TTE F-UP OR LMTD: CPT

## 2024-08-11 PROCEDURE — 6370000000 HC RX 637 (ALT 250 FOR IP): Performed by: STUDENT IN AN ORGANIZED HEALTH CARE EDUCATION/TRAINING PROGRAM

## 2024-08-11 PROCEDURE — 36415 COLL VENOUS BLD VENIPUNCTURE: CPT

## 2024-08-11 PROCEDURE — 80048 BASIC METABOLIC PNL TOTAL CA: CPT

## 2024-08-11 PROCEDURE — 2580000003 HC RX 258: Performed by: STUDENT IN AN ORGANIZED HEALTH CARE EDUCATION/TRAINING PROGRAM

## 2024-08-11 PROCEDURE — 6370000000 HC RX 637 (ALT 250 FOR IP): Performed by: INTERNAL MEDICINE

## 2024-08-11 PROCEDURE — 6370000000 HC RX 637 (ALT 250 FOR IP): Performed by: EMERGENCY MEDICINE

## 2024-08-11 PROCEDURE — 83735 ASSAY OF MAGNESIUM: CPT

## 2024-08-11 PROCEDURE — 1100000003 HC PRIVATE W/ TELEMETRY

## 2024-08-11 PROCEDURE — 84100 ASSAY OF PHOSPHORUS: CPT

## 2024-08-11 PROCEDURE — 85025 COMPLETE CBC W/AUTO DIFF WBC: CPT

## 2024-08-11 RX ORDER — VENLAFAXINE HYDROCHLORIDE 150 MG/1
150 CAPSULE, EXTENDED RELEASE ORAL DAILY
Status: DISCONTINUED | OUTPATIENT
Start: 2024-08-11 | End: 2024-08-19 | Stop reason: HOSPADM

## 2024-08-11 RX ORDER — ANASTROZOLE 1 MG/1
1 TABLET ORAL DAILY
Status: DISCONTINUED | OUTPATIENT
Start: 2024-08-11 | End: 2024-08-19 | Stop reason: HOSPADM

## 2024-08-11 RX ORDER — GABAPENTIN 100 MG/1
100 CAPSULE ORAL 2 TIMES DAILY
Status: DISCONTINUED | OUTPATIENT
Start: 2024-08-11 | End: 2024-08-19 | Stop reason: HOSPADM

## 2024-08-11 RX ORDER — TRAZODONE HYDROCHLORIDE 50 MG/1
50 TABLET ORAL NIGHTLY
Status: DISCONTINUED | OUTPATIENT
Start: 2024-08-11 | End: 2024-08-19 | Stop reason: HOSPADM

## 2024-08-11 RX ORDER — BUPROPION HYDROCHLORIDE 150 MG/1
300 TABLET ORAL EVERY MORNING
Status: DISCONTINUED | OUTPATIENT
Start: 2024-08-11 | End: 2024-08-19 | Stop reason: HOSPADM

## 2024-08-11 RX ORDER — VENLAFAXINE 37.5 MG/1
37.5 TABLET ORAL DAILY
Status: DISCONTINUED | OUTPATIENT
Start: 2024-08-11 | End: 2024-08-19 | Stop reason: HOSPADM

## 2024-08-11 RX ADMIN — ACETAMINOPHEN 650 MG: 325 TABLET ORAL at 20:35

## 2024-08-11 RX ADMIN — MECLIZINE 12.5 MG: 12.5 TABLET ORAL at 10:19

## 2024-08-11 RX ADMIN — ENOXAPARIN SODIUM 40 MG: 100 INJECTION SUBCUTANEOUS at 10:19

## 2024-08-11 RX ADMIN — GABAPENTIN 100 MG: 100 CAPSULE ORAL at 14:14

## 2024-08-11 RX ADMIN — ACETAMINOPHEN 650 MG: 325 TABLET ORAL at 14:13

## 2024-08-11 RX ADMIN — GABAPENTIN 100 MG: 100 CAPSULE ORAL at 20:35

## 2024-08-11 RX ADMIN — MECLIZINE 12.5 MG: 12.5 TABLET ORAL at 18:53

## 2024-08-11 RX ADMIN — SODIUM CHLORIDE, PRESERVATIVE FREE 10 ML: 5 INJECTION INTRAVENOUS at 09:00

## 2024-08-11 RX ADMIN — ASPIRIN 81 MG: 81 TABLET, CHEWABLE ORAL at 10:19

## 2024-08-11 RX ADMIN — ONDANSETRON 4 MG: 4 TABLET, ORALLY DISINTEGRATING ORAL at 18:53

## 2024-08-11 RX ADMIN — SODIUM CHLORIDE, PRESERVATIVE FREE 10 ML: 5 INJECTION INTRAVENOUS at 20:37

## 2024-08-11 RX ADMIN — PANTOPRAZOLE SODIUM 40 MG: 40 TABLET, DELAYED RELEASE ORAL at 06:38

## 2024-08-11 RX ADMIN — TRAZODONE HYDROCHLORIDE 50 MG: 50 TABLET ORAL at 20:35

## 2024-08-11 RX ADMIN — ACETAMINOPHEN 650 MG: 325 TABLET ORAL at 07:00

## 2024-08-11 ASSESSMENT — PAIN SCALES - GENERAL
PAINLEVEL_OUTOF10: 6
PAINLEVEL_OUTOF10: 3
PAINLEVEL_OUTOF10: 3

## 2024-08-11 ASSESSMENT — PAIN DESCRIPTION - ORIENTATION: ORIENTATION: LEFT

## 2024-08-11 ASSESSMENT — PAIN DESCRIPTION - LOCATION: LOCATION: HIP

## 2024-08-11 ASSESSMENT — PAIN SCALES - WONG BAKER: WONGBAKER_NUMERICALRESPONSE: NO HURT

## 2024-08-11 NOTE — PROGRESS NOTES
Hospitalist Progress Note    NAME:   Lizeth Soloanselmo   : 1955   MRN: [id number]     Date/Time: 2024 8:58 AM  Patient PCP: Hugh Leroy MD    Estimated discharge date:   Barriers:  echocardiogram, nuclear stress test, cardiology clearance      Assessment / Plan:  Chest pain-etiology to be determined  Dizziness  Admit to medical telemetry  Troponin 4-4, EKG sinus rhythm, RBBB-no prior EKG for comparison  CT head and neck-no evidence of acute intracranial abnormality, no evidence of significant stenosis or aneurysm CTA head and neck  Chest x-ray no acute cardiopulmonary process  Chest pain nonreproducible on examination vs  possible anxiety versus GERD  Orthostatic in ED was negative after NS bolus  Ordered orthostatic in a.m.  Meclizine as needed for dizziness  Low risk for PE, ordered D-dimer  PT/OT eval  If persistent chest pain-May need to consider cardiology eval, patient follows up with Dr. Sandoval  Pharmacy consulted for medical reconciliation  8/10: Patient denies any chest pain currently.  Reports that her dizziness is persistent along with sweating.  I spoke with neurology Dr. Alcantara and he agrees with getting MRI of the brain.  Meanwhile her troponin trend is 4, 4 and 5 which is essentially normal.  Echocardiogram has been ordered.  Will also consult cardiology for further input.  : MRI brain negative for any acute intracranial abnormality.  CT angiogram head and neck negative for large vessel occlusion.  I spoke with neurology Dr. Alcantara, they have already signed off.  Appreciate input from cardiology, plan is to get echocardiogram and nuclear stress test tomorrow.  N.p.o. after midnight for the same.    History of bilateral mastectomy due to breast cancer  History of cholecystectomy  8/10: Resume home dose of anastrozole.    Anxiety  Depression  Neuropathy  Continue with Neurontin, trazodone, Effexor.    GERD  Continue with Protonix.      Medical Decision Making:   I personally

## 2024-08-11 NOTE — PROGRESS NOTES
Cardiology Progress Note      8/11/2024 4:57 PM    Admit Date: 8/9/2024          Subjective:  No new issues overnight          /64   Pulse 71   Temp 97.9 °F (36.6 °C) (Oral)   Resp 16   Ht 1.524 m (5')   Wt 94.3 kg (207 lb 14.3 oz)   SpO2 99%   BMI 40.60 kg/m²   08/09 1901 - 08/11 0700  In: 350 [P.O.:350]  Out: 900 [Urine:550]        Objective:      Physical Exam:  VS as above     Data Review:   Labs:    Hgb 14.5   BUN 14  Creat 0.7     Telemetry: SR       Assessment:     1. Chest pain with negative cardiac enzymes.  2. Right bundle branch block on EKG.  3. Recent lightheadedness.  4. Mildly elevated TSH.  5. History of breast cancer with prior bilateral mastectomies.  6. Chronic back pain with prior back surgery and recent kyphoplasty.  7. Obesity.    Plan: For nuclear stress tomm. If OK can go home from my standpoint

## 2024-08-11 NOTE — CONSULTS
[facility]              [address] [place] [address]                              CONSULTATION      PATIENT NAME: RENNY POTTS             : 1955  MED REC NO: [id number]                       ROOM: 128  ACCOUNT NO: [id number]                       ADMIT DATE: 2024  PROVIDER: Samm Sandoval MD    DATE OF SERVICE:  08/10/2024    ATTENDING PHYSICIAN:  ISAURA HADLEY    REASON FOR CONSULTATION:  Chest pain.    CHIEF COMPLAINT:  Chest pain.    HISTORY OF PRESENT ILLNESS:  The patient is a 69-year-old female with a history of chronic right bundle-branch block, but no other prior cardiac history.  She was admitted yesterday when she developed lightheadedness with standing as well as some midsternal chest discomfort, which was nonradiating.  She came to the ER and was admitted to the hospitalist service.  Her EKG did not show any ischemic changes.  She had three sets of high sensitivity troponins and were all within normal limits.  She is feeling better now.  She has been somewhat weak lately and has not been eating particularly well.  No history of hypertension, diabetes, or dyslipidemia.  She is not very physically active.  She did have a prior outpatient stress test a few years ago that was unremarkable.  No other complaints at this time.  No syncope.    PAST MEDICAL HISTORY:  Remarkable for breast cancer with prior bilateral mastectomies.  Also, prior cholecystectomy, prior back fusion surgery, bilateral knee replacements, and recent kyphoplasty.    CURRENT MEDICATIONS:  Aspirin, potassium chloride, Antivert, Zofran, Protonix, trazodone, Lovenox 40 mg subcu daily.    SOCIAL HISTORY:  The patient does not smoke or drink.  She is a  and lives locally.    FAMILY HISTORY:  The patient's father had a stroke and a heart attack.    REVIEW OF SYSTEMS:  As noted above, otherwise noncontributory.    PHYSICAL EXAMINATION:  GENERAL:  Reveals an elderly

## 2024-08-11 NOTE — PROGRESS NOTES
End of Shift Note    Bedside shift change report given to DEMARIO pritchtet   (oncoming nurse) by DEMARIO hoffmann.        Shift worked:  nights   Shift summary and any significant changes:     Awaiting echo  No acute events       Concerns for physician to address:     Zone phone for oncoming shift:        Patient Information  Lizeth Llamas  69 y.o.  8/9/2024  4:36 PM by Shereen Ruiz MD. Lizeth Llamas was admitted from [facility]    Problem List  Patient Active Problem List    Diagnosis Date Noted    Dizziness 08/09/2024     No past medical history on file.    Core Measures:  CVA: yes  CHF: no  PNA: no    Activity:  Level of Assistance: Standby assist, set-up cues, supervision of patient - no hands on  Number times ambulated in hallways past shift: 0  Number of times OOB to chair past shift: 2    Cardiac:   Cardiac Monitoring: yes,     Access:   Current line(s): PIV    Respiratory:   O2 Device: None (Room air)    GI:  Last BM (including prior to admit): 08/10/24  Current diet:  ADULT DIET; Regular; Low Fat/Low Chol/High Fiber/2 gm Na  Tolerating current diet: Yes    Pain Management:   Patient states pain is manageable on current regimen: N/A    Skin:  Kan Scale Score: 18  Interventions: dual skin   Pressure injury: no    Patient Safety:  Fall Score: Gonzalez Total Score: 85  Interventions: stay with me program  Self-release roll belt: No  Dexterity to release roll belt: N/A   (must document dexterity  here by stating Yes or No here, otherwise this is a restraint and must follow restraint documentation policy.)    DVT prophylaxis:  DVT prophylaxis: meds    Active Consults:  IP CONSULT TO HOSPITALIST  IP CONSULT TO PHARMACY  IP CONSULT TO CARDIOLOGY  IP CONSULT TO NEUROLOGY    Length of Stay:  Expected LOS: 3  Actual LOS: 1    DEMARIO Pritchett                               - - -

## 2024-08-11 NOTE — CASE COMMUNICATION
COMMUNICATION NOTE - Neurology Service    Name:  Lizeth Llamas     MRN: [id number]         Communication Note:   MRI brain - No acute intracranial abnormality.   CTA head and neck negative for Large vessel occlusion  I explained in detail about the current condition.   Rest of the management per primary team.  Neurology will sign off.   Please do not hesitate to call with questions or concerns.  Please let us know if we can provide any additional information.

## 2024-08-12 ENCOUNTER — APPOINTMENT (OUTPATIENT)
Facility: HOSPITAL | Age: 69
DRG: 149 | End: 2024-08-12
Payer: MEDICARE

## 2024-08-12 LAB
ANION GAP SERPL CALC-SCNC: 2 MMOL/L (ref 5–15)
BASOPHILS # BLD: 0 K/UL (ref 0–0.1)
BASOPHILS NFR BLD: 1 % (ref 0–1)
BUN SERPL-MCNC: 11 MG/DL (ref 6–20)
BUN/CREAT SERPL: 16 (ref 12–20)
CALCIUM SERPL-MCNC: 8.9 MG/DL (ref 8.5–10.1)
CHLORIDE SERPL-SCNC: 110 MMOL/L (ref 97–108)
CO2 SERPL-SCNC: 29 MMOL/L (ref 21–32)
CREAT SERPL-MCNC: 0.7 MG/DL (ref 0.55–1.02)
DIFFERENTIAL METHOD BLD: NORMAL
ECHO BSA: 2 M2
EKG DIAGNOSIS: NORMAL
EOSINOPHIL # BLD: 0.3 K/UL (ref 0–0.4)
EOSINOPHIL NFR BLD: 6 % (ref 0–7)
ERYTHROCYTE [DISTWIDTH] IN BLOOD BY AUTOMATED COUNT: 12.8 % (ref 11.5–14.5)
GLUCOSE SERPL-MCNC: 107 MG/DL (ref 65–100)
HCT VFR BLD AUTO: 43.5 % (ref 35–47)
HGB BLD-MCNC: 13.5 G/DL (ref 11.5–16)
IMM GRANULOCYTES # BLD AUTO: 0 K/UL (ref 0–0.04)
IMM GRANULOCYTES NFR BLD AUTO: 0 % (ref 0–0.5)
LYMPHOCYTES # BLD: 2.5 K/UL (ref 0.8–3.5)
LYMPHOCYTES NFR BLD: 41 % (ref 12–49)
MAGNESIUM SERPL-MCNC: 2.6 MG/DL (ref 1.6–2.4)
MCH RBC QN AUTO: 29 PG (ref 26–34)
MCHC RBC AUTO-ENTMCNC: 31 G/DL (ref 30–36.5)
MCV RBC AUTO: 93.5 FL (ref 80–99)
MONOCYTES # BLD: 0.5 K/UL (ref 0–1)
MONOCYTES NFR BLD: 9 % (ref 5–13)
NEUTS SEG # BLD: 2.6 K/UL (ref 1.8–8)
NEUTS SEG NFR BLD: 43 % (ref 32–75)
NRBC # BLD: 0 K/UL (ref 0–0.01)
NRBC BLD-RTO: 0 PER 100 WBC
PHOSPHATE SERPL-MCNC: 2.9 MG/DL (ref 2.6–4.7)
PLATELET # BLD AUTO: 276 K/UL (ref 150–400)
PMV BLD AUTO: 9.3 FL (ref 8.9–12.9)
POTASSIUM SERPL-SCNC: 3.9 MMOL/L (ref 3.5–5.1)
RBC # BLD AUTO: 4.65 M/UL (ref 3.8–5.2)
SODIUM SERPL-SCNC: 141 MMOL/L (ref 136–145)
STRESS BASELINE DIAS BP: 62 MMHG
STRESS BASELINE HR: 69 BPM
STRESS BASELINE SYS BP: 123 MMHG
STRESS ESTIMATED WORKLOAD: 1 METS
STRESS O2 SAT PEAK: 98 %
STRESS O2 SAT REST: 97 %
STRESS PEAK DIAS BP: 67 MMHG
STRESS PEAK SYS BP: 126 MMHG
STRESS PERCENT HR ACHIEVED: 58 %
STRESS POST PEAK HR: 88 BPM
STRESS RATE PRESSURE PRODUCT: NORMAL BPM*MMHG
STRESS TARGET HR: 152 BPM
WBC # BLD AUTO: 6 K/UL (ref 3.6–11)

## 2024-08-12 PROCEDURE — 6370000000 HC RX 637 (ALT 250 FOR IP): Performed by: STUDENT IN AN ORGANIZED HEALTH CARE EDUCATION/TRAINING PROGRAM

## 2024-08-12 PROCEDURE — 97535 SELF CARE MNGMENT TRAINING: CPT

## 2024-08-12 PROCEDURE — 6370000000 HC RX 637 (ALT 250 FOR IP): Performed by: INTERNAL MEDICINE

## 2024-08-12 PROCEDURE — 36415 COLL VENOUS BLD VENIPUNCTURE: CPT

## 2024-08-12 PROCEDURE — 1100000003 HC PRIVATE W/ TELEMETRY

## 2024-08-12 PROCEDURE — 78452 HT MUSCLE IMAGE SPECT MULT: CPT

## 2024-08-12 PROCEDURE — 6360000002 HC RX W HCPCS: Performed by: INTERNAL MEDICINE

## 2024-08-12 PROCEDURE — 6370000000 HC RX 637 (ALT 250 FOR IP): Performed by: EMERGENCY MEDICINE

## 2024-08-12 PROCEDURE — 6360000002 HC RX W HCPCS: Performed by: STUDENT IN AN ORGANIZED HEALTH CARE EDUCATION/TRAINING PROGRAM

## 2024-08-12 PROCEDURE — 3430000000 HC RX DIAGNOSTIC RADIOPHARMACEUTICAL: Performed by: INTERNAL MEDICINE

## 2024-08-12 PROCEDURE — 2580000003 HC RX 258: Performed by: STUDENT IN AN ORGANIZED HEALTH CARE EDUCATION/TRAINING PROGRAM

## 2024-08-12 PROCEDURE — 93017 CV STRESS TEST TRACING ONLY: CPT

## 2024-08-12 PROCEDURE — 84100 ASSAY OF PHOSPHORUS: CPT

## 2024-08-12 PROCEDURE — 80048 BASIC METABOLIC PNL TOTAL CA: CPT

## 2024-08-12 PROCEDURE — 83735 ASSAY OF MAGNESIUM: CPT

## 2024-08-12 PROCEDURE — 97116 GAIT TRAINING THERAPY: CPT

## 2024-08-12 PROCEDURE — 85025 COMPLETE CBC W/AUTO DIFF WBC: CPT

## 2024-08-12 PROCEDURE — A9500 TC99M SESTAMIBI: HCPCS | Performed by: INTERNAL MEDICINE

## 2024-08-12 RX ORDER — ASCORBIC ACID 500 MG
500 TABLET ORAL DAILY
COMMUNITY

## 2024-08-12 RX ORDER — TETRAKIS(2-METHOXYISOBUTYLISOCYANIDE)COPPER(I) TETRAFLUOROBORATE 1 MG/ML
32.6 INJECTION, POWDER, LYOPHILIZED, FOR SOLUTION INTRAVENOUS
Status: COMPLETED | OUTPATIENT
Start: 2024-08-12 | End: 2024-08-12

## 2024-08-12 RX ORDER — TETRAKIS(2-METHOXYISOBUTYLISOCYANIDE)COPPER(I) TETRAFLUOROBORATE 1 MG/ML
10.4 INJECTION, POWDER, LYOPHILIZED, FOR SOLUTION INTRAVENOUS
Status: COMPLETED | OUTPATIENT
Start: 2024-08-12 | End: 2024-08-12

## 2024-08-12 RX ORDER — OMEPRAZOLE 20 MG/1
20 CAPSULE, DELAYED RELEASE ORAL DAILY
COMMUNITY

## 2024-08-12 RX ORDER — DENOSUMAB 60 MG/ML
60 INJECTION SUBCUTANEOUS ONCE
COMMUNITY

## 2024-08-12 RX ORDER — IBUPROFEN 200 MG
400 TABLET ORAL DAILY PRN
COMMUNITY

## 2024-08-12 RX ORDER — REGADENOSON 0.08 MG/ML
0.4 INJECTION, SOLUTION INTRAVENOUS
Status: COMPLETED | OUTPATIENT
Start: 2024-08-12 | End: 2024-08-12

## 2024-08-12 RX ORDER — POLYETHYLENE GLYCOL 3350 17 G/17G
17 POWDER, FOR SOLUTION ORAL DAILY
COMMUNITY

## 2024-08-12 RX ADMIN — TETRAKIS(2-METHOXYISOBUTYLISOCYANIDE)COPPER(I) TETRAFLUOROBORATE 10.4 MILLICURIE: 1 INJECTION, POWDER, LYOPHILIZED, FOR SOLUTION INTRAVENOUS at 09:10

## 2024-08-12 RX ADMIN — MECLIZINE 12.5 MG: 12.5 TABLET ORAL at 12:31

## 2024-08-12 RX ADMIN — PANTOPRAZOLE SODIUM 40 MG: 40 TABLET, DELAYED RELEASE ORAL at 06:13

## 2024-08-12 RX ADMIN — ACETAMINOPHEN 650 MG: 325 TABLET ORAL at 21:44

## 2024-08-12 RX ADMIN — ACETAMINOPHEN 650 MG: 325 TABLET ORAL at 12:31

## 2024-08-12 RX ADMIN — BUPROPION HYDROCHLORIDE 300 MG: 150 TABLET, EXTENDED RELEASE ORAL at 08:49

## 2024-08-12 RX ADMIN — ENOXAPARIN SODIUM 40 MG: 100 INJECTION SUBCUTANEOUS at 08:55

## 2024-08-12 RX ADMIN — MECLIZINE 12.5 MG: 12.5 TABLET ORAL at 21:44

## 2024-08-12 RX ADMIN — SODIUM CHLORIDE, PRESERVATIVE FREE 10 ML: 5 INJECTION INTRAVENOUS at 08:49

## 2024-08-12 RX ADMIN — GABAPENTIN 100 MG: 100 CAPSULE ORAL at 08:49

## 2024-08-12 RX ADMIN — ONDANSETRON 4 MG: 4 TABLET, ORALLY DISINTEGRATING ORAL at 12:31

## 2024-08-12 RX ADMIN — VENLAFAXINE HYDROCHLORIDE 37.5 MG: 37.5 TABLET ORAL at 08:49

## 2024-08-12 RX ADMIN — TRAZODONE HYDROCHLORIDE 50 MG: 50 TABLET ORAL at 21:44

## 2024-08-12 RX ADMIN — VENLAFAXINE HYDROCHLORIDE 150 MG: 150 CAPSULE, EXTENDED RELEASE ORAL at 08:49

## 2024-08-12 RX ADMIN — ANASTROZOLE 1 MG: 1 TABLET, COATED ORAL at 08:54

## 2024-08-12 RX ADMIN — GABAPENTIN 100 MG: 100 CAPSULE ORAL at 21:44

## 2024-08-12 RX ADMIN — ACETAMINOPHEN 650 MG: 325 TABLET ORAL at 04:26

## 2024-08-12 RX ADMIN — ASPIRIN 81 MG: 81 TABLET, CHEWABLE ORAL at 08:49

## 2024-08-12 RX ADMIN — TETRAKIS(2-METHOXYISOBUTYLISOCYANIDE)COPPER(I) TETRAFLUOROBORATE 32.6 MILLICURIE: 1 INJECTION, POWDER, LYOPHILIZED, FOR SOLUTION INTRAVENOUS at 10:35

## 2024-08-12 RX ADMIN — REGADENOSON 0.4 MG: 0.08 INJECTION, SOLUTION INTRAVENOUS at 10:35

## 2024-08-12 ASSESSMENT — PAIN SCALES - GENERAL
PAINLEVEL_OUTOF10: 0
PAINLEVEL_OUTOF10: 7
PAINLEVEL_OUTOF10: 6
PAINLEVEL_OUTOF10: 4

## 2024-08-12 ASSESSMENT — PAIN DESCRIPTION - ORIENTATION
ORIENTATION: LEFT
ORIENTATION: RIGHT

## 2024-08-12 ASSESSMENT — PAIN DESCRIPTION - LOCATION
LOCATION: HIP
LOCATION: HIP

## 2024-08-12 ASSESSMENT — PAIN DESCRIPTION - DESCRIPTORS
DESCRIPTORS: ACHING
DESCRIPTORS: SHARP

## 2024-08-12 NOTE — PROGRESS NOTES
Pharmacy Medication Reconciliation     The patient was interviewed regarding current PTA medication list, use and drug allergies;  patient present in room and obtained permission from patient to discuss drug regimen with visitor(s) present. The patient was questioned regarding use of any other inhalers, topical products, over the counter medications, herbal medications, vitamin products or ophthalmic/nasal/otic medication use.     Allergy Update: Codeine, Penicillins, and Sulfamethoxazole-trimethoprim    Recommendations/Findings:   The following amendments were made to the patient's active medication list on file at [facility]:   1) Additions: Advil, tylenol PM, Vitamin C, Prolia, prilosec, miralax     2) Deletions: none    3) Changes:   - Calcium carbonate tablets to chewables (cannot swallow the tablet)   - gabapentin 100 BID to Gabapentin 100 mg AM and 300 mg PM       Pertinent Findings: patient brought out pill box and showed her system for taking her medications.     Clarified PTA med list with patient. PTA medication list was corrected to the following:     Prior to Admission Medications   Prescriptions Last Dose Informant   Calcium Carbonate-Vit D-Min (CALCIUM 600+D3 PLUS MINERALS) 600-800 MG-UNIT CHEW 8/9/2024    Sig: Take 1 tablet by mouth daily   acetaminophen (TYLENOL) 500 MG tablet 8/10/2024    Sig: Take 2 tablets by mouth 2 times daily at 0800 and 1400   anastrozole (ARIMIDEX) 1 MG tablet 8/9/2024    Sig: Take 1 tablet by mouth daily   buPROPion (WELLBUTRIN XL) 300 MG extended release tablet 8/9/2024    Sig: Take 1 tablet by mouth every morning   cetirizine (ZYRTEC) 10 MG tablet 8/9/2024    Sig: Take 1 tablet by mouth daily   denosumab (PROLIA) 60 MG/ML SOSY SC injection     Sig: Inject 1 mL into the skin once Twice a year   diphenhydrAMINE-APAP, sleep, (TYLENOL PM EXTRA STRENGTH)  MG tablet     Sig: Take 1 tablet by mouth nightly as needed for Sleep   empagliflozin (JARDIANCE) 25 MG tablet 8/9/2024

## 2024-08-12 NOTE — PROGRESS NOTES
Physical Therapy:    Attempted to see this pt for PT session. Pt is currently DOMINIQUE for stress test. Will defer and continue to follow as appropriate. Thanks.    Maria Esther Giang, PTA

## 2024-08-12 NOTE — PROGRESS NOTES
End of Shift Note    Bedside shift change report given to DEMARIO Almanza   (oncoming nurse) by DEMARIO Vega.        Shift worked:  0782-2509   Shift summary and any significant changes:     Echo and stress test complete. CM working on placement.       Concerns for physician to address:  NA   Zone phone for oncoming shift:   [phone]     Patient Information  Lizeth Llamas  69 y.o.  8/9/2024  4:36 PM by Shereen Ruiz MD. Lizeth Llamas was admitted from [facility]    Problem List  Patient Active Problem List    Diagnosis Date Noted    Dizziness 08/09/2024     No past medical history on file.    Core Measures:  CVA: yes  CHF: no  PNA: no    Activity:  Level of Assistance: Standby assist, set-up cues, supervision of patient - no hands on  Number times ambulated in hallways past shift: 0  Number of times OOB to chair past shift: 2    Cardiac:   Cardiac Monitoring: yes,     Access:   Current line(s): PIV    Respiratory:   O2 Device: None (Room air)    GI:  Last BM (including prior to admit): 08/12/24  Current diet:  ADULT DIET; Regular; Low Fat/Low Chol/High Fiber/2 gm Na  Tolerating current diet: Yes    Pain Management:   Patient states pain is manageable on current regimen: N/A    Skin:  Kan Scale Score: 22  Interventions: dual skin   Pressure injury: no    Patient Safety:  Fall Score: Gonzalez Total Score: 70  Interventions: stay with me program  Self-release roll belt: No  Dexterity to release roll belt: N/A   (must document dexterity  here by stating Yes or No here, otherwise this is a restraint and must follow restraint documentation policy.)    DVT prophylaxis:  DVT prophylaxis: meds    Active Consults:  IP CONSULT TO HOSPITALIST  IP CONSULT TO PHARMACY  IP CONSULT TO CARDIOLOGY  IP CONSULT TO NEUROLOGY    Length of Stay:  Expected LOS: 4  Actual LOS: 3      DEMARIO Vega

## 2024-08-12 NOTE — PROGRESS NOTES
Occupational Therapy    Chart reviewed and interventions attempted. Pt DOMINIQUE at stress test. Will defer and continue to follow.

## 2024-08-12 NOTE — PROGRESS NOTES
Hospitalist Progress Note    NAME:   Lizeth WOOD Depjaniceis   : 1955   MRN: [id number]     Date/Time: 2024 8:50 AM  Patient PCP: Hugh Leroy MD    Estimated discharge date:   Barriers: Placement      Assessment / Plan:  Chest pain-etiology to be determined  Dizziness  Admit to medical telemetry  Troponin 4-4, EKG sinus rhythm, RBBB-no prior EKG for comparison  CT head and neck-no evidence of acute intracranial abnormality, no evidence of significant stenosis or aneurysm CTA head and neck  Chest x-ray no acute cardiopulmonary process  Chest pain nonreproducible on examination vs  possible anxiety versus GERD  Orthostatic in ED was negative after NS bolus  Ordered orthostatic in a.m.  Meclizine as needed for dizziness  Low risk for PE, ordered D-dimer  PT/OT eval  If persistent chest pain-May need to consider cardiology eval, patient follows up with Dr. Sandoval  Pharmacy consulted for medical reconciliation  8/10: Patient denies any chest pain currently.  Reports that her dizziness is persistent along with sweating.  I spoke with neurology Dr. Alcantara and he agrees with getting MRI of the brain.  Meanwhile her troponin trend is 4, 4 and 5 which is essentially normal.  Echocardiogram has been ordered.  Will also consult cardiology for further input.  : MRI brain negative for any acute intracranial abnormality.  CT angiogram head and neck negative for large vessel occlusion.  I spoke with neurology Dr. Alcantara, they have already signed off.  Appreciate input from cardiology, plan is to get echocardiogram and nuclear stress test tomorrow.  N.p.o. after midnight for the same.  : Echocardiogram was done yesterday and report is pending.  The nuclear stress test today does not show any ischemia or infarct and shows a EF of 57%.  Awaiting final recommendation from cardiology.  PT OT recommending rehab for the patient.    History of bilateral mastectomy due to breast cancer  History of

## 2024-08-12 NOTE — PROGRESS NOTES
Cardiology Progress Note      8/12/2024 2:21 PM    Admit Date: 8/9/2024          Subjective: Nuclear stress negative. EF 57%. No new c/o.          /78   Pulse 72   Temp 97.7 °F (36.5 °C) (Oral)   Resp 16   Ht 1.524 m (5')   Wt 94.3 kg (207 lb 14.3 oz)   SpO2 95%   BMI 40.60 kg/m²   No intake/output data recorded.        Objective:      Physical Exam:    VS as above   Data Review:   Labs:      Hgb 13.5  BUN 11  Creat 0.7      Telemetry: SR       Assessment:       1. Chest pain with negative cardiac enzymes.Neg nuclear stress , nl EF   2. Right bundle branch block on EKG.  3. Recent lightheadedness.  4. Mildly elevated TSH.  5. History of breast cancer with prior bilateral mastectomies.  6. Chronic back pain with prior back surgery and recent kyphoplasty.  7. Obesity.    Plan: No additional recc. Agree with plans for rehab

## 2024-08-12 NOTE — PROGRESS NOTES
End of Shift Note    Bedside shift change report given to DEMARIO Luther  (oncoming nurse) by DEMARIO Duong.        Shift worked:  Days   Shift summary and any significant changes:     Echo completed.  Patient to be NPO after midnight for stress test.  Some home med ordered.      Concerns for physician to address:     Zone phone for oncoming shift:        Patient Information  Lizeth Llamas  69 y.o.  8/9/2024  4:36 PM by Shereen Ruiz MD. Lizeth Llamas was admitted from [facility]    Problem List  Patient Active Problem List    Diagnosis Date Noted    Dizziness 08/09/2024     No past medical history on file.    Core Measures:  CVA: yes  CHF: no  PNA: no    Activity:  Level of Assistance: Standby assist, set-up cues, supervision of patient - no hands on  Number times ambulated in hallways past shift: 0  Number of times OOB to chair past shift: 2    Cardiac:   Cardiac Monitoring: yes,     Access:   Current line(s): PIV    Respiratory:   O2 Device: None (Room air)    GI:  Last BM (including prior to admit): 08/10/24  Current diet:  ADULT DIET; Regular; Low Fat/Low Chol/High Fiber/2 gm Na  Diet NPO  Tolerating current diet: Yes    Pain Management:   Patient states pain is manageable on current regimen: N/A    Skin:  Kan Scale Score: 22  Interventions: dual skin   Pressure injury: no    Patient Safety:  Fall Score: Gonzalez Total Score: 70  Interventions: stay with me program  Self-release roll belt: No  Dexterity to release roll belt: N/A   (must document dexterity  here by stating Yes or No here, otherwise this is a restraint and must follow restraint documentation policy.)    DVT prophylaxis:  DVT prophylaxis: meds    Active Consults:  IP CONSULT TO HOSPITALIST  IP CONSULT TO PHARMACY  IP CONSULT TO CARDIOLOGY  IP CONSULT TO NEUROLOGY    Length of Stay:  Expected LOS: 4  Actual LOS: 2    DEMARIO Duong

## 2024-08-13 PROCEDURE — 2580000003 HC RX 258: Performed by: STUDENT IN AN ORGANIZED HEALTH CARE EDUCATION/TRAINING PROGRAM

## 2024-08-13 PROCEDURE — 1100000003 HC PRIVATE W/ TELEMETRY

## 2024-08-13 PROCEDURE — 97110 THERAPEUTIC EXERCISES: CPT

## 2024-08-13 PROCEDURE — 6370000000 HC RX 637 (ALT 250 FOR IP): Performed by: STUDENT IN AN ORGANIZED HEALTH CARE EDUCATION/TRAINING PROGRAM

## 2024-08-13 PROCEDURE — 6360000002 HC RX W HCPCS: Performed by: INTERNAL MEDICINE

## 2024-08-13 PROCEDURE — 6360000002 HC RX W HCPCS: Performed by: STUDENT IN AN ORGANIZED HEALTH CARE EDUCATION/TRAINING PROGRAM

## 2024-08-13 PROCEDURE — 97116 GAIT TRAINING THERAPY: CPT

## 2024-08-13 PROCEDURE — 6370000000 HC RX 637 (ALT 250 FOR IP): Performed by: EMERGENCY MEDICINE

## 2024-08-13 PROCEDURE — 6370000000 HC RX 637 (ALT 250 FOR IP): Performed by: INTERNAL MEDICINE

## 2024-08-13 RX ORDER — KETOROLAC TROMETHAMINE 30 MG/ML
15 INJECTION, SOLUTION INTRAMUSCULAR; INTRAVENOUS ONCE
Status: COMPLETED | OUTPATIENT
Start: 2024-08-13 | End: 2024-08-13

## 2024-08-13 RX ADMIN — MECLIZINE 12.5 MG: 12.5 TABLET ORAL at 13:23

## 2024-08-13 RX ADMIN — ENOXAPARIN SODIUM 40 MG: 100 INJECTION SUBCUTANEOUS at 10:29

## 2024-08-13 RX ADMIN — GABAPENTIN 100 MG: 100 CAPSULE ORAL at 10:30

## 2024-08-13 RX ADMIN — SODIUM CHLORIDE, PRESERVATIVE FREE 10 ML: 5 INJECTION INTRAVENOUS at 21:06

## 2024-08-13 RX ADMIN — TRAZODONE HYDROCHLORIDE 50 MG: 50 TABLET ORAL at 21:05

## 2024-08-13 RX ADMIN — VENLAFAXINE HYDROCHLORIDE 150 MG: 150 CAPSULE, EXTENDED RELEASE ORAL at 10:30

## 2024-08-13 RX ADMIN — PANTOPRAZOLE SODIUM 40 MG: 40 TABLET, DELAYED RELEASE ORAL at 07:23

## 2024-08-13 RX ADMIN — ANASTROZOLE 1 MG: 1 TABLET, COATED ORAL at 10:30

## 2024-08-13 RX ADMIN — ACETAMINOPHEN 650 MG: 325 TABLET ORAL at 13:28

## 2024-08-13 RX ADMIN — ACETAMINOPHEN 650 MG: 325 TABLET ORAL at 07:24

## 2024-08-13 RX ADMIN — ONDANSETRON 4 MG: 2 INJECTION INTRAMUSCULAR; INTRAVENOUS at 07:23

## 2024-08-13 RX ADMIN — ASPIRIN 81 MG: 81 TABLET, CHEWABLE ORAL at 10:29

## 2024-08-13 RX ADMIN — VENLAFAXINE HYDROCHLORIDE 37.5 MG: 37.5 TABLET ORAL at 10:30

## 2024-08-13 RX ADMIN — BUPROPION HYDROCHLORIDE 300 MG: 150 TABLET, EXTENDED RELEASE ORAL at 10:30

## 2024-08-13 RX ADMIN — GABAPENTIN 100 MG: 100 CAPSULE ORAL at 21:05

## 2024-08-13 RX ADMIN — KETOROLAC TROMETHAMINE 15 MG: 30 INJECTION, SOLUTION INTRAMUSCULAR at 16:37

## 2024-08-13 RX ADMIN — SODIUM CHLORIDE, PRESERVATIVE FREE 10 ML: 5 INJECTION INTRAVENOUS at 10:30

## 2024-08-13 ASSESSMENT — PAIN DESCRIPTION - LOCATION
LOCATION: HIP

## 2024-08-13 ASSESSMENT — PAIN DESCRIPTION - DESCRIPTORS
DESCRIPTORS: ACHING

## 2024-08-13 ASSESSMENT — PAIN SCALES - GENERAL
PAINLEVEL_OUTOF10: 5
PAINLEVEL_OUTOF10: 6
PAINLEVEL_OUTOF10: 8

## 2024-08-13 ASSESSMENT — PAIN DESCRIPTION - ORIENTATION
ORIENTATION: LEFT

## 2024-08-13 NOTE — PROGRESS NOTES
End of Shift Note    Bedside shift change report given to Cami RN (oncoming nurse) by Gayle Azul RN (offgoing nurse).  Report included the following information SBAR    Shift worked:  1147-3115     Shift summary and any significant changes:    C/o left hip pain and dizziness during. One time dose of Toradol ordered. 22g PIV placed. Awaiting approval from [facility].      Concerns for physician to address:  See above     Zone phone for oncoming shift:   [phone]       Activity:     Number times ambulated in hallways past shift: 0  Number of times OOB to chair past shift: 1    Cardiac:   Cardiac Monitoring: Yes           Access:  Current line(s): PIV     Genitourinary:   Urinary status: voiding    Respiratory:      Chronic home O2 use?: N/A  Incentive spirometer at bedside: N/A       GI:     Current diet:  ADULT DIET; Regular; Low Fat/Low Chol/High Fiber/2 gm Na  Passing flatus: YES  Tolerating current diet: YES       Pain Management:   Patient states pain is manageable on current regimen: YES    Skin:     Interventions: turn team, increase time out of bed, and nutritional support    Patient Safety:  Fall Score:    Interventions: bed/chair alarm, assistive device (walker, cane. etc), pt to call before getting OOB, stay with me (per policy), and gait belt       Length of Stay:  Expected LOS: 5  Actual LOS: 4      Gayle Azul RN

## 2024-08-13 NOTE — PROGRESS NOTES
Hospitalist Progress Note    NAME:   Lizeth WOOD Depjaniceis   : 1955   MRN: [id number]     Date/Time: 2024 9:07 AM  Patient PCP: Hugh Leroy MD    Estimated discharge date: Medically stable  Barriers: Placement, echocardiogram result      Assessment / Plan:  Chest pain-etiology to be determined  Dizziness  Admit to medical telemetry  Troponin 4-4, EKG sinus rhythm, RBBB-no prior EKG for comparison  CT head and neck-no evidence of acute intracranial abnormality, no evidence of significant stenosis or aneurysm CTA head and neck  Chest x-ray no acute cardiopulmonary process  Chest pain nonreproducible on examination vs  possible anxiety versus GERD  Orthostatic in ED was negative after NS bolus  Ordered orthostatic in a.m.  Meclizine as needed for dizziness  Low risk for PE, ordered D-dimer  PT/OT eval  If persistent chest pain-May need to consider cardiology eval, patient follows up with Dr. Sandoval  Pharmacy consulted for medical reconciliation  8/10: Patient denies any chest pain currently.  Reports that her dizziness is persistent along with sweating.  I spoke with neurology Dr. Alcantara and he agrees with getting MRI of the brain.  Meanwhile her troponin trend is 4, 4 and 5 which is essentially normal.  Echocardiogram has been ordered.  Will also consult cardiology for further input.  : MRI brain negative for any acute intracranial abnormality.  CT angiogram head and neck negative for large vessel occlusion.  I spoke with neurology Dr. Alcantara, they have already signed off.  Appreciate input from cardiology, plan is to get echocardiogram and nuclear stress test tomorrow.  N.p.o. after midnight for the same.  : Echocardiogram was done yesterday and report is pending.  The nuclear stress test today does not show any ischemia or infarct and shows a EF of 57%.  Awaiting final recommendation from cardiology.  PT OT recommending rehab for the patient.  : Patient has been cleared by cardiology.

## 2024-08-14 LAB
ECHO AO ASC DIAM: 3.4 CM
ECHO AO ASCENDING AORTA INDEX: 1.79 CM/M2
ECHO AO ROOT DIAM: 2.9 CM
ECHO AO ROOT INDEX: 1.53 CM/M2
ECHO AV AREA PEAK VELOCITY: 2.3 CM2
ECHO AV AREA PEAK VELOCITY: 2.5 CM2
ECHO AV AREA VTI: 2.3 CM2
ECHO AV AREA/BSA VTI: 1.2 CM2/M2
ECHO AV MEAN GRADIENT: 5 MMHG
ECHO AV MEAN VELOCITY: 1 M/S
ECHO AV PEAK GRADIENT: 8 MMHG
ECHO AV PEAK GRADIENT: 9 MMHG
ECHO AV PEAK VELOCITY: 1.4 M/S
ECHO AV PEAK VELOCITY: 1.5 M/S
ECHO AV VTI: 31.8 CM
ECHO BSA: 2 M2
ECHO LA DIAMETER INDEX: 2.32 CM/M2
ECHO LA DIAMETER: 4.4 CM
ECHO LA TO AORTIC ROOT RATIO: 1.52
ECHO LV E' LATERAL VELOCITY: 12 CM/S
ECHO LV E' SEPTAL VELOCITY: 8 CM/S
ECHO LV FRACTIONAL SHORTENING: 37 % (ref 28–44)
ECHO LV INTERNAL DIMENSION DIASTOLE INDEX: 2.26 CM/M2
ECHO LV INTERNAL DIMENSION DIASTOLIC: 4.3 CM (ref 3.9–5.3)
ECHO LV INTERNAL DIMENSION SYSTOLIC INDEX: 1.42 CM/M2
ECHO LV INTERNAL DIMENSION SYSTOLIC: 2.7 CM
ECHO LV IVSD: 1.1 CM (ref 0.6–0.9)
ECHO LV MASS 2D: 152.6 G (ref 67–162)
ECHO LV MASS INDEX 2D: 80.3 G/M2 (ref 43–95)
ECHO LV POSTERIOR WALL DIASTOLIC: 1 CM (ref 0.6–0.9)
ECHO LV RELATIVE WALL THICKNESS RATIO: 0.47
ECHO LVOT AREA: 3.1 CM2
ECHO LVOT AV VTI INDEX: 0.69
ECHO LVOT DIAM: 2 CM
ECHO LVOT MEAN GRADIENT: 2 MMHG
ECHO LVOT PEAK GRADIENT: 5 MMHG
ECHO LVOT PEAK VELOCITY: 1.1 M/S
ECHO LVOT STROKE VOLUME INDEX: 36.4 ML/M2
ECHO LVOT SV: 69.1 ML
ECHO LVOT VTI: 22 CM
ECHO MV A VELOCITY: 0.9 M/S
ECHO MV E DECELERATION TIME (DT): 209.7 MS
ECHO MV E VELOCITY: 0.83 M/S
ECHO MV E/A RATIO: 0.92
ECHO MV E/E' LATERAL: 6.92
ECHO MV E/E' RATIO (AVERAGED): 8.65
ECHO MV E/E' SEPTAL: 10.38
ECHO PV MAX VELOCITY: 1.1 M/S
ECHO PV PEAK GRADIENT: 5 MMHG
ECHO RV FREE WALL PEAK S': 12 CM/S
ECHO RV TAPSE: 2.2 CM (ref 1.7–?)
ECHO TV REGURGITANT MAX VELOCITY: 2.19 M/S
ECHO TV REGURGITANT PEAK GRADIENT: 19 MMHG

## 2024-08-14 PROCEDURE — 1100000003 HC PRIVATE W/ TELEMETRY

## 2024-08-14 PROCEDURE — 6370000000 HC RX 637 (ALT 250 FOR IP): Performed by: INTERNAL MEDICINE

## 2024-08-14 PROCEDURE — 6370000000 HC RX 637 (ALT 250 FOR IP): Performed by: STUDENT IN AN ORGANIZED HEALTH CARE EDUCATION/TRAINING PROGRAM

## 2024-08-14 PROCEDURE — 97535 SELF CARE MNGMENT TRAINING: CPT

## 2024-08-14 PROCEDURE — 6360000002 HC RX W HCPCS: Performed by: STUDENT IN AN ORGANIZED HEALTH CARE EDUCATION/TRAINING PROGRAM

## 2024-08-14 PROCEDURE — 6370000000 HC RX 637 (ALT 250 FOR IP): Performed by: EMERGENCY MEDICINE

## 2024-08-14 PROCEDURE — 2580000003 HC RX 258: Performed by: STUDENT IN AN ORGANIZED HEALTH CARE EDUCATION/TRAINING PROGRAM

## 2024-08-14 RX ADMIN — GABAPENTIN 100 MG: 100 CAPSULE ORAL at 09:06

## 2024-08-14 RX ADMIN — PANTOPRAZOLE SODIUM 40 MG: 40 TABLET, DELAYED RELEASE ORAL at 06:29

## 2024-08-14 RX ADMIN — ENOXAPARIN SODIUM 40 MG: 100 INJECTION SUBCUTANEOUS at 09:06

## 2024-08-14 RX ADMIN — ASPIRIN 81 MG: 81 TABLET, CHEWABLE ORAL at 09:06

## 2024-08-14 RX ADMIN — MECLIZINE 12.5 MG: 12.5 TABLET ORAL at 14:16

## 2024-08-14 RX ADMIN — MECLIZINE 12.5 MG: 12.5 TABLET ORAL at 06:28

## 2024-08-14 RX ADMIN — ANASTROZOLE 1 MG: 1 TABLET, COATED ORAL at 09:08

## 2024-08-14 RX ADMIN — VENLAFAXINE HYDROCHLORIDE 150 MG: 150 CAPSULE, EXTENDED RELEASE ORAL at 09:06

## 2024-08-14 RX ADMIN — VENLAFAXINE HYDROCHLORIDE 37.5 MG: 37.5 TABLET ORAL at 09:06

## 2024-08-14 RX ADMIN — ACETAMINOPHEN 650 MG: 325 TABLET ORAL at 06:27

## 2024-08-14 RX ADMIN — MECLIZINE 12.5 MG: 12.5 TABLET ORAL at 20:32

## 2024-08-14 RX ADMIN — GABAPENTIN 100 MG: 100 CAPSULE ORAL at 20:32

## 2024-08-14 RX ADMIN — BUPROPION HYDROCHLORIDE 300 MG: 150 TABLET, EXTENDED RELEASE ORAL at 09:06

## 2024-08-14 RX ADMIN — TRAZODONE HYDROCHLORIDE 50 MG: 50 TABLET ORAL at 20:31

## 2024-08-14 RX ADMIN — SODIUM CHLORIDE, PRESERVATIVE FREE 10 ML: 5 INJECTION INTRAVENOUS at 09:08

## 2024-08-14 RX ADMIN — ONDANSETRON 4 MG: 4 TABLET, ORALLY DISINTEGRATING ORAL at 14:16

## 2024-08-14 RX ADMIN — SODIUM CHLORIDE, PRESERVATIVE FREE 5 ML: 5 INJECTION INTRAVENOUS at 20:33

## 2024-08-14 ASSESSMENT — PAIN SCALES - GENERAL
PAINLEVEL_OUTOF10: 0
PAINLEVEL_OUTOF10: 2
PAINLEVEL_OUTOF10: 6

## 2024-08-14 ASSESSMENT — PAIN DESCRIPTION - LOCATION
LOCATION: HIP
LOCATION: HIP

## 2024-08-14 ASSESSMENT — PAIN DESCRIPTION - ORIENTATION: ORIENTATION: LEFT

## 2024-08-14 ASSESSMENT — PAIN DESCRIPTION - DESCRIPTORS
DESCRIPTORS: ACHING
DESCRIPTORS: ACHING

## 2024-08-14 NOTE — PROGRESS NOTES
Hospitalist Progress Note    NAME:   Lizeth Llamas   : 1955   MRN: [id number]     Date/Time: 2024 5:01 PM  Patient PCP: Hugh Leroy MD    Estimated discharge date: Medically stable  Barriers: Placement, echocardiogram result      Assessment / Plan:    Chest pain-etiology to be determined  Dizziness  Admit to medical telemetry  Troponin 4-4, EKG sinus rhythm, RBBB-no prior EKG for comparison  CT head and neck-no evidence of acute intracranial abnormality, no evidence of significant stenosis or aneurysm CTA head and neck  Chest x-ray no acute cardiopulmonary process  Chest pain nonreproducible on examination vs  possible anxiety versus GERD  Orthostatic in ED was negative after NS bolus  Ordered orthostatic in a.m.  Meclizine as needed for dizziness  Low risk for PE, ordered D-dimer  PT/OT eval  If persistent chest pain-May need to consider cardiology eval, patient follows up with Dr. Sandoval  MRI brain negative for any acute intracranial abnormality.  CT angiogram head and neck negative for large vessel occlusion.  Appreciate input from cardiology, nuclear stress test today does not show any ischemia or infarct and shows a EF of 57%. Echo with the same.   Patient has been cleared by cardiology.    As needed meclizine.  We can increase the dose of meclizine to 25 mg 3 times daily if the current dose is not working.  Waiting for placement at this point.      History of bilateral mastectomy due to breast cancer  History of cholecystectomy  Continue home dose of anastrozole.    Anxiety  Depression  Neuropathy  Continue with Neurontin, trazodone, Effexor.  Encouraged continued use of talk therapy and other coping strategies     GERD  Continue with Protonix.      Medical Decision Making:   I personally reviewed labs:   I personally reviewed imaging: Echo resulted today as above  I personally reviewed EKG:  Toxic drug monitoring:    Discussed case with:         Code Status: Full code  DVT

## 2024-08-15 ENCOUNTER — APPOINTMENT (OUTPATIENT)
Facility: HOSPITAL | Age: 69
DRG: 149 | End: 2024-08-15
Payer: MEDICARE

## 2024-08-15 PROCEDURE — 6360000002 HC RX W HCPCS: Performed by: STUDENT IN AN ORGANIZED HEALTH CARE EDUCATION/TRAINING PROGRAM

## 2024-08-15 PROCEDURE — 6370000000 HC RX 637 (ALT 250 FOR IP): Performed by: INTERNAL MEDICINE

## 2024-08-15 PROCEDURE — 1100000003 HC PRIVATE W/ TELEMETRY

## 2024-08-15 PROCEDURE — 6370000000 HC RX 637 (ALT 250 FOR IP): Performed by: STUDENT IN AN ORGANIZED HEALTH CARE EDUCATION/TRAINING PROGRAM

## 2024-08-15 PROCEDURE — 6370000000 HC RX 637 (ALT 250 FOR IP): Performed by: EMERGENCY MEDICINE

## 2024-08-15 PROCEDURE — 73502 X-RAY EXAM HIP UNI 2-3 VIEWS: CPT

## 2024-08-15 PROCEDURE — 2580000003 HC RX 258: Performed by: STUDENT IN AN ORGANIZED HEALTH CARE EDUCATION/TRAINING PROGRAM

## 2024-08-15 RX ORDER — ASPIRIN 81 MG/1
81 TABLET, CHEWABLE ORAL DAILY
Qty: 30 TABLET | Refills: 0 | Status: SHIPPED | OUTPATIENT
Start: 2024-08-16

## 2024-08-15 RX ORDER — ACETAMINOPHEN 500 MG
500 TABLET ORAL 4 TIMES DAILY PRN
Qty: 360 TABLET | Refills: 1 | Status: SHIPPED | OUTPATIENT
Start: 2024-08-15

## 2024-08-15 RX ORDER — LIDOCAINE 50 MG/G
1 PATCH TOPICAL DAILY PRN
Qty: 10 PATCH | Refills: 0 | Status: SHIPPED | OUTPATIENT
Start: 2024-08-15 | End: 2024-08-25

## 2024-08-15 RX ORDER — MECLIZINE HCL 12.5 MG/1
12.5 TABLET ORAL 3 TIMES DAILY PRN
Qty: 30 TABLET | Refills: 0 | Status: SHIPPED | OUTPATIENT
Start: 2024-08-15 | End: 2024-08-25

## 2024-08-15 RX ORDER — KETOROLAC TROMETHAMINE 30 MG/ML
15 INJECTION, SOLUTION INTRAMUSCULAR; INTRAVENOUS EVERY 6 HOURS PRN
Status: DISCONTINUED | OUTPATIENT
Start: 2024-08-15 | End: 2024-08-16

## 2024-08-15 RX ADMIN — ACETAMINOPHEN 650 MG: 325 TABLET ORAL at 05:08

## 2024-08-15 RX ADMIN — VENLAFAXINE HYDROCHLORIDE 37.5 MG: 37.5 TABLET ORAL at 09:31

## 2024-08-15 RX ADMIN — ASPIRIN 81 MG: 81 TABLET, CHEWABLE ORAL at 09:31

## 2024-08-15 RX ADMIN — ONDANSETRON 4 MG: 4 TABLET, ORALLY DISINTEGRATING ORAL at 05:08

## 2024-08-15 RX ADMIN — ONDANSETRON 4 MG: 4 TABLET, ORALLY DISINTEGRATING ORAL at 20:00

## 2024-08-15 RX ADMIN — PANTOPRAZOLE SODIUM 40 MG: 40 TABLET, DELAYED RELEASE ORAL at 07:23

## 2024-08-15 RX ADMIN — GABAPENTIN 100 MG: 100 CAPSULE ORAL at 09:31

## 2024-08-15 RX ADMIN — ACETAMINOPHEN 650 MG: 325 TABLET ORAL at 20:01

## 2024-08-15 RX ADMIN — MECLIZINE 12.5 MG: 12.5 TABLET ORAL at 16:13

## 2024-08-15 RX ADMIN — MECLIZINE 12.5 MG: 12.5 TABLET ORAL at 05:08

## 2024-08-15 RX ADMIN — TRAZODONE HYDROCHLORIDE 50 MG: 50 TABLET ORAL at 20:00

## 2024-08-15 RX ADMIN — GABAPENTIN 100 MG: 100 CAPSULE ORAL at 19:58

## 2024-08-15 RX ADMIN — ACETAMINOPHEN 650 MG: 325 TABLET ORAL at 16:12

## 2024-08-15 RX ADMIN — VENLAFAXINE HYDROCHLORIDE 150 MG: 150 CAPSULE, EXTENDED RELEASE ORAL at 09:31

## 2024-08-15 RX ADMIN — SODIUM CHLORIDE, PRESERVATIVE FREE 10 ML: 5 INJECTION INTRAVENOUS at 09:30

## 2024-08-15 RX ADMIN — ANASTROZOLE 1 MG: 1 TABLET, COATED ORAL at 09:31

## 2024-08-15 RX ADMIN — ENOXAPARIN SODIUM 40 MG: 100 INJECTION SUBCUTANEOUS at 09:31

## 2024-08-15 RX ADMIN — BUPROPION HYDROCHLORIDE 300 MG: 150 TABLET, EXTENDED RELEASE ORAL at 09:31

## 2024-08-15 ASSESSMENT — PAIN SCALES - GENERAL
PAINLEVEL_OUTOF10: 6
PAINLEVEL_OUTOF10: 8
PAINLEVEL_OUTOF10: 6
PAINLEVEL_OUTOF10: 6

## 2024-08-15 ASSESSMENT — PAIN DESCRIPTION - ORIENTATION
ORIENTATION: LEFT
ORIENTATION: LOWER
ORIENTATION: LOWER

## 2024-08-15 ASSESSMENT — PAIN DESCRIPTION - LOCATION
LOCATION: BACK
LOCATION: BUTTOCKS
LOCATION: BACK
LOCATION: BACK;HIP

## 2024-08-15 ASSESSMENT — PAIN DESCRIPTION - ONSET
ONSET: GRADUAL
ONSET: AWAKENED FROM SLEEP

## 2024-08-15 ASSESSMENT — PAIN DESCRIPTION - PAIN TYPE
TYPE: CHRONIC PAIN
TYPE: CHRONIC PAIN

## 2024-08-15 ASSESSMENT — PAIN DESCRIPTION - DESCRIPTORS: DESCRIPTORS: ACHING

## 2024-08-15 ASSESSMENT — PAIN DESCRIPTION - FREQUENCY
FREQUENCY: CONTINUOUS
FREQUENCY: CONTINUOUS

## 2024-08-15 NOTE — PROGRESS NOTES
End of Shift Note    Bedside shift change report given to  DEMARIO Mcgowan  (oncoming nurse) by DEMARIO Duong.        Shift worked:  Days   Shift summary and any significant changes:     Waiting for Xray. IV went bad and patient refuses (politely) another.  Patient plan for DC is IPR. Waiting for Auth.        Concerns for physician to address:     Zone phone for oncoming shift:        Patient Information  Lizeth Llamas  69 y.o.  8/9/2024  4:36 PM by Shereen Ruiz MD. Lizeth Llamas was admitted from [facility]    Problem List  Patient Active Problem List    Diagnosis Date Noted    Dizziness 08/09/2024     No past medical history on file.    Core Measures:  CVA: no  CHF: no  PNA: no    Activity:  Level of Assistance: Standby assist, set-up cues, supervision of patient - no hands on  Number times ambulated in hallways past shift: 0  Number of times OOB to chair past shift: 2    Cardiac:   Cardiac Monitoring: yes,     Access:   Current line(s): PIV    Respiratory:   O2 Device: None (Room air)    GI:  Last BM (including prior to admit): 08/14/24  Current diet:  ADULT DIET; Regular; Low Fat/Low Chol/High Fiber/2 gm Na  Tolerating current diet: Yes    Pain Management:   Patient states pain is manageable on current regimen: yes    Skin:  Kan Scale Score: 21  Interventions: dual skin   Pressure injury: no    Patient Safety:  Fall Score: Gonzalez Total Score: 50  Interventions: stay with me program  Self-release roll belt: No  Dexterity to release roll belt: N/A   (must document dexterity  here by stating Yes or No here, otherwise this is a restraint and must follow restraint documentation policy.)    DVT prophylaxis:  DVT prophylaxis: meds    Active Consults:  IP CONSULT TO HOSPITALIST  IP CONSULT TO PHARMACY  IP CONSULT TO CARDIOLOGY  IP CONSULT TO NEUROLOGY  IP CONSULT TO PSYCHIATRY    Length of Stay:  Expected LOS: 6  Actual LOS: 6    DEMARIO Duong

## 2024-08-16 PROCEDURE — 6370000000 HC RX 637 (ALT 250 FOR IP): Performed by: STUDENT IN AN ORGANIZED HEALTH CARE EDUCATION/TRAINING PROGRAM

## 2024-08-16 PROCEDURE — 6370000000 HC RX 637 (ALT 250 FOR IP): Performed by: EMERGENCY MEDICINE

## 2024-08-16 PROCEDURE — 6370000000 HC RX 637 (ALT 250 FOR IP): Performed by: INTERNAL MEDICINE

## 2024-08-16 PROCEDURE — 97116 GAIT TRAINING THERAPY: CPT

## 2024-08-16 PROCEDURE — 6360000002 HC RX W HCPCS: Performed by: STUDENT IN AN ORGANIZED HEALTH CARE EDUCATION/TRAINING PROGRAM

## 2024-08-16 PROCEDURE — 6370000000 HC RX 637 (ALT 250 FOR IP): Performed by: NURSE PRACTITIONER

## 2024-08-16 PROCEDURE — 97530 THERAPEUTIC ACTIVITIES: CPT

## 2024-08-16 PROCEDURE — 6360000002 HC RX W HCPCS: Performed by: NURSE PRACTITIONER

## 2024-08-16 PROCEDURE — 1100000003 HC PRIVATE W/ TELEMETRY

## 2024-08-16 RX ORDER — KETOROLAC TROMETHAMINE 30 MG/ML
15 INJECTION, SOLUTION INTRAMUSCULAR; INTRAVENOUS EVERY 6 HOURS PRN
Status: DISCONTINUED | OUTPATIENT
Start: 2024-08-16 | End: 2024-08-17

## 2024-08-16 RX ORDER — LIDOCAINE 4 G/G
1 PATCH TOPICAL DAILY PRN
Status: DISCONTINUED | OUTPATIENT
Start: 2024-08-16 | End: 2024-08-19 | Stop reason: HOSPADM

## 2024-08-16 RX ORDER — CETIRIZINE HYDROCHLORIDE 10 MG/1
10 TABLET ORAL DAILY PRN
Status: DISCONTINUED | OUTPATIENT
Start: 2024-08-16 | End: 2024-08-19 | Stop reason: HOSPADM

## 2024-08-16 RX ADMIN — BUPROPION HYDROCHLORIDE 300 MG: 150 TABLET, EXTENDED RELEASE ORAL at 08:15

## 2024-08-16 RX ADMIN — VENLAFAXINE HYDROCHLORIDE 37.5 MG: 37.5 TABLET ORAL at 08:15

## 2024-08-16 RX ADMIN — TRAZODONE HYDROCHLORIDE 50 MG: 50 TABLET ORAL at 20:53

## 2024-08-16 RX ADMIN — ACETAMINOPHEN 650 MG: 325 TABLET ORAL at 01:23

## 2024-08-16 RX ADMIN — VENLAFAXINE HYDROCHLORIDE 150 MG: 150 CAPSULE, EXTENDED RELEASE ORAL at 08:15

## 2024-08-16 RX ADMIN — ANASTROZOLE 1 MG: 1 TABLET, COATED ORAL at 08:15

## 2024-08-16 RX ADMIN — KETOROLAC TROMETHAMINE 15 MG: 30 INJECTION, SOLUTION INTRAMUSCULAR; INTRAVENOUS at 20:53

## 2024-08-16 RX ADMIN — MECLIZINE 12.5 MG: 12.5 TABLET ORAL at 01:17

## 2024-08-16 RX ADMIN — MECLIZINE 12.5 MG: 12.5 TABLET ORAL at 16:02

## 2024-08-16 RX ADMIN — ACETAMINOPHEN 650 MG: 325 TABLET ORAL at 20:57

## 2024-08-16 RX ADMIN — ASPIRIN 81 MG: 81 TABLET, CHEWABLE ORAL at 08:15

## 2024-08-16 RX ADMIN — CETIRIZINE HYDROCHLORIDE 10 MG: 10 TABLET, FILM COATED ORAL at 02:22

## 2024-08-16 RX ADMIN — GABAPENTIN 100 MG: 100 CAPSULE ORAL at 20:53

## 2024-08-16 RX ADMIN — ENOXAPARIN SODIUM 40 MG: 100 INJECTION SUBCUTANEOUS at 08:14

## 2024-08-16 RX ADMIN — GABAPENTIN 100 MG: 100 CAPSULE ORAL at 08:15

## 2024-08-16 RX ADMIN — PANTOPRAZOLE SODIUM 40 MG: 40 TABLET, DELAYED RELEASE ORAL at 06:06

## 2024-08-16 RX ADMIN — KETOROLAC TROMETHAMINE 15 MG: 30 INJECTION, SOLUTION INTRAMUSCULAR; INTRAVENOUS at 02:22

## 2024-08-16 RX ADMIN — ACETAMINOPHEN 650 MG: 325 TABLET ORAL at 16:03

## 2024-08-16 ASSESSMENT — PAIN DESCRIPTION - ORIENTATION
ORIENTATION: LEFT
ORIENTATION: POSTERIOR

## 2024-08-16 ASSESSMENT — PAIN DESCRIPTION - FREQUENCY
FREQUENCY: INTERMITTENT
FREQUENCY: CONTINUOUS

## 2024-08-16 ASSESSMENT — PAIN DESCRIPTION - LOCATION
LOCATION: HIP
LOCATION: BACK;HIP
LOCATION: BACK
LOCATION: HIP

## 2024-08-16 ASSESSMENT — PAIN DESCRIPTION - DESCRIPTORS
DESCRIPTORS: ACHING

## 2024-08-16 ASSESSMENT — PAIN SCALES - GENERAL
PAINLEVEL_OUTOF10: 6
PAINLEVEL_OUTOF10: 2
PAINLEVEL_OUTOF10: 6
PAINLEVEL_OUTOF10: 4

## 2024-08-16 ASSESSMENT — PAIN DESCRIPTION - ONSET
ONSET: ON-GOING
ONSET: GRADUAL

## 2024-08-16 ASSESSMENT — PAIN DESCRIPTION - PAIN TYPE
TYPE: CHRONIC PAIN
TYPE: CHRONIC PAIN

## 2024-08-16 NOTE — PROGRESS NOTES
End of Shift Note    Bedside shift change report given to  Rhoda RN  (oncoming nurse) by Enedina HANKS.        Shift worked:  night   Shift summary and any significant changes:    Patient plan for DC is IPR. Waiting for Auth.Hip xray done ,still c/o dizzines.gave zofran sublingual.         Concerns for physician to address:     Zone phone for oncoming shift:        Patient Information  Lizeth Llamas  69 y.o.  8/9/2024  4:36 PM by Shereen Ruiz MD. Lizeth Llamas was admitted from [facility]    Problem List  Patient Active Problem List    Diagnosis Date Noted    Dizziness 08/09/2024     No past medical history on file.    Core Measures:  CVA: no  CHF: no  PNA: no    Activity:  Level of Assistance: Standby assist, set-up cues, supervision of patient - no hands on  Number times ambulated in hallways past shift: 0  Number of times OOB to chair past shift: 2    Cardiac:   Cardiac Monitoring: yes,     Access:   Current line(s): PIV    Respiratory:   O2 Device: None (Room air)    GI:  Last BM (including prior to admit): 08/15/24  Current diet:  ADULT DIET; Regular; Low Fat/Low Chol/High Fiber/2 gm Na  Tolerating current diet: Yes    Pain Management:   Patient states pain is manageable on current regimen: yes    Skin:  Kan Scale Score: 20  Interventions: dual skin   Pressure injury: no    Patient Safety:  Fall Score: Gonzalez Total Score: 30  Interventions: stay with me program  Self-release roll belt: No  Dexterity to release roll belt: N/A   (must document dexterity  here by stating Yes or No here, otherwise this is a restraint and must follow restraint documentation policy.)    DVT prophylaxis:  DVT prophylaxis: meds    Active Consults:  IP CONSULT TO HOSPITALIST  IP CONSULT TO PHARMACY  IP CONSULT TO CARDIOLOGY  IP CONSULT TO NEUROLOGY  IP CONSULT TO PSYCHIATRY    Length of Stay:  Expected LOS: 6  Actual LOS: 6    DEMARIO Duong

## 2024-08-16 NOTE — PROGRESS NOTES
Nursing contacted Nocturnist/cross cover provider via non-urgent messaging system charming charlie and notified patient asking to have her Zyrtec that she takes for allergic rhinitis daily, states that she is developing symptoms of allergies with itchy throat and runny nose, denies any other acute symptomatology, patient is requesting to have her Zyrtec ordered. No other concerns reported. No acute distress reported. No other information provided by nurse. VSS.     Ordered Zyrtec 10 mg p.o. daily as needed per patient requested as she takes at home for allergic rhinitis symptomatology. Will defer further evaluation/management to the day shift primary attending care team. Patient denies any further complaints or concerns.     Nursing to notify Hospitalist for further/continued concerns. Will remain available overnight for further concerns if nursing/patient needs. Please note, there are RRT systems in this hospital in place that if nursing has acute or critical patient condition change or concern, this is to help facilitate and notify that patient needs immediate bedside evaluation by a provider.     Update:  Nurse reported at 0139 patient reporting hip pain despite having Tylenol 650 mg, states IV Toradol had been ordered but patient is refusing IV access and does not want any narcotic medications is requesting to have something by mouth or something stronger.  No other concerns reported.  No acute distress reported vital signs stable at present.  Adjusted medication to make it injectable so patient may obtain this for pain discomfort.  Lidocaine patch topical to hip daily as needed ordered.  Defer further medications to dayshift attending provider unless I am called overnight.    Non-billable note.

## 2024-08-16 NOTE — PROGRESS NOTES
End of Shift Note    Bedside shift change report given to DEMARIO Madden  (oncoming nurse) by DEMARIO Duong.        Shift worked:  DAYS   Shift summary and any significant changes:    Patient plan for DC is IPR. Waiting for Auth. Meclizine and tylenol X 1.  Met with NP for MH.        Concerns for physician to address:     Zone phone for oncoming shift:        Patient Information  Lizeth Llamas  69 y.o.  8/9/2024  4:36 PM by Shereen Ruiz MD. Lizeth Llamas was admitted from [facility]    Problem List  Patient Active Problem List    Diagnosis Date Noted    Dizziness 08/09/2024     No past medical history on file.    Core Measures:  CVA: no  CHF: no  PNA: no    Activity:  Level of Assistance: Standby assist, set-up cues, supervision of patient - no hands on  Number times ambulated in hallways past shift: 0  Number of times OOB to chair past shift: 2    Cardiac:   Cardiac Monitoring: yes,     Access:   Current line(s): No IV - Patient refuses new one    Respiratory:   O2 Device: None (Room air)    GI:  Last BM (including prior to admit): 08/15/24  Current diet:  ADULT DIET; Regular  Tolerating current diet: Yes    Pain Management:   Patient states pain is manageable on current regimen: yes    Skin:  Kan Scale Score: 22  Interventions: dual skin   Pressure injury: no    Patient Safety:  Fall Score: Gonzalez Total Score: 50  Interventions: stay with me program  Self-release roll belt: No  Dexterity to release roll belt: N/A   (must document dexterity  here by stating Yes or No here, otherwise this is a restraint and must follow restraint documentation policy.)    DVT prophylaxis:  DVT prophylaxis: meds    Active Consults:  IP CONSULT TO HOSPITALIST  IP CONSULT TO PHARMACY  IP CONSULT TO CARDIOLOGY  IP CONSULT TO NEUROLOGY  IP CONSULT TO PSYCHIATRY    Length of Stay:  Expected LOS: 8  Actual LOS: 7    DEMARIO Duong

## 2024-08-16 NOTE — BSMART NOTE
Liaison did discuss and review outpatient  referrals, including for [facility]. Liaison did consult with pt's  about pt's concerns. CM agrees to f/u. Liaison will continue to monitor and to support.     Precipitant Factors are severe back and hip pain, death of dog 2 weeks ago, deaths of  and mother in past 5 years, limited mobility, loss of independence.    The information is given by the patient.  Current Psychiatrist and/or  is Dr. Martines for psychiatric services virtually. Pt reports weekly to bi-weekly sessions with her counselor, Arcelia, at [facility]  Previous Hospitalizations/Treatment: Pt reports virtual psychiatric care with Dr. Martines for hx of depression and anxiety. Pt currently prescribed Effexor and Wellbutrin daily. Pt doesn't feel that the medications are adequate to address her symptoms. Pt reports counseling with Arcelia at [facility] weekly to bi-weekly for the past 5&1/2 years.     Lethality Assessment:  The potential for suicide noted by the following: not noted.    The potential for homicide is not noted.  The patient has not been a perpetrator of sexual or physical abuse.    There are not pending charges.  The patient is felt to be at some risk for self-harm or harm to others d/t her reported inability  to function in her home alone at this time.     Section II - Psychosocial  The patient's overall mood and attitude is depressed and anxious.  Some feelings of helplessness and hopelessness are observed by pt's report.  Generalized anxiety is observed by pt's report.  Recent panic is observed by pt's report. Phobias are not observed.  Obsessive compulsive tendencies are not observed.      Section III - Mental Status Exam  The patient's appearance is distraught.  The patient's behavior is tearful. The patient is oriented to time, place, person and situation.  The patient's speech shows no evidence of impairment.  The patient's mood is

## 2024-08-16 NOTE — CONSULTS
PSYCHIATRY CONSULT NOTE    REASON FOR CONSULT:  Depression/Anxiety    HISTORY OF PRESENTING COMPLAINT:  Lizeth Llamas is a 69 y.o. White (non-) female who is currently admitted to the medical floor at [facility]. She is alert and oriented x 4. Speech is clear and spontaneous. Mood is depressed and anxious. Lizeth reports that she has been anxious and has been \"crying a lot\". She states that all the tests that has been run are coming back negative. She continues to have pain in her hip.     She indicates that she has been through quite a lot over the past five to six years and feels that it has \"all come to a head\". She states that it is believed that her chest pain and related symptoms are thought to be related to anxiety.     She explains that she has been taking care of her mother and father over the last five/six years. Her dog. passed away two weeks ago and she has had several deaths.     She reports a loss in interest of the that she typically likes doing. She reports that there are times that she would lose track of what she is saying as well and that she     She was seeing a counselor befoire her  passed away. zia Rojas she reports that it had gotten so bad that she could no longer attend. She reports thart her  did   She reports the grief group seemed to make her feel worse. However, she continued to see her counselor and she has been seeing her x five years. Her counselor suggested that she should see a psychiatrist. She has been seeing Dr. Gerry Baker with [facility] x two years, she reports that she does telehealth appontments with [facility].     She reports that there has been improvement initally hwoever, she does not feel that there are any improvements as of late. She reprots that her children state that she has been sad and really grumpy. This has been ongoing and apparently has gotten worse when her  passed away. She reports that she has not really

## 2024-08-16 NOTE — PROGRESS NOTES
Comprehensive Nutrition Assessment    Type and Reason for Visit:  Initial, RD Nutrition Re-Screen/LOS    Nutrition Recommendations/Plan:   Regular diet     Malnutrition Assessment:  Malnutrition Status:  No malnutrition (08/16/24 1534)      Nutrition Assessment:    Patient medically noted for chest pain and dizziness. PMH Breast cancer, bilateral mastectomy, and cholecystectomy. Chart reviewed for length of stay. Patient receiving a cardiac diet. Reports a pretty good appetite but doesn't always like the food. Requests to be on a regular diet to be allowed more options; will adjust orders. No recent weight changes reported. Discharge orders noted; awaiting insurance authorization.     Nutrition Related Findings:    Labs reviewed   BM 8/15   Trace edema Wellbutrin, Protonix   Wound Type: None       Current Nutrition Intake & Therapies:    Average Meal Intake: 51-75%, %  Average Supplements Intake: None Ordered  ADULT DIET; Regular; Low Fat/Low Chol/High Fiber/2 gm Na    Anthropometric Measures:  Height: 152.4 cm (5')  Ideal Body Weight (IBW): 100 lbs (45 kg)       Current Body Weight: 94.3 kg (207 lb 14.3 oz),   IBW.    Current BMI (kg/m2): 40.6                          BMI Categories: Obese Class 3 (BMI 40.0 or greater)    Estimated Daily Nutrient Needs:  Energy Requirements Based On: Formula  Weight Used for Energy Requirements: Current  Energy (kcal/day): 1814 kcals (BMR x 1.3AF)  Weight Used for Protein Requirements: Current  Protein (g/day): 75-94g (0.8-1.0 g/kg bw)  Method Used for Fluid Requirements: 1 ml/kcal  Fluid (ml/day): 1800 mL    Nutrition Diagnosis:   No nutrition diagnosis at this time    Nutrition Interventions:   Food and/or Nutrient Delivery: Modify Current Diet  Nutrition Education/Counseling: No recommendation at this time  Coordination of Nutrition Care: Continue to monitor while inpatient       Goals:     Goals: PO intake 75% or greater, by next RD assessment       Nutrition Monitoring

## 2024-08-16 NOTE — PROGRESS NOTES
Occupational Therapy  8/16/2024    Chart reviewed and up to date, attempted OT session but patient currently with psychiatry. Will defer and follow up as able and appropriate.    Thank you,  Shana Simmons, OTALBERT, OTR/L

## 2024-08-17 PROCEDURE — 6370000000 HC RX 637 (ALT 250 FOR IP): Performed by: STUDENT IN AN ORGANIZED HEALTH CARE EDUCATION/TRAINING PROGRAM

## 2024-08-17 PROCEDURE — 1100000003 HC PRIVATE W/ TELEMETRY

## 2024-08-17 PROCEDURE — 6370000000 HC RX 637 (ALT 250 FOR IP): Performed by: EMERGENCY MEDICINE

## 2024-08-17 PROCEDURE — 6360000002 HC RX W HCPCS: Performed by: STUDENT IN AN ORGANIZED HEALTH CARE EDUCATION/TRAINING PROGRAM

## 2024-08-17 PROCEDURE — 6370000000 HC RX 637 (ALT 250 FOR IP): Performed by: INTERNAL MEDICINE

## 2024-08-17 RX ORDER — ASCORBIC ACID 500 MG
500 TABLET ORAL DAILY
Status: DISCONTINUED | OUTPATIENT
Start: 2024-08-17 | End: 2024-08-19 | Stop reason: HOSPADM

## 2024-08-17 RX ORDER — FAMOTIDINE 20 MG/1
20 TABLET, FILM COATED ORAL 2 TIMES DAILY
Status: DISCONTINUED | OUTPATIENT
Start: 2024-08-17 | End: 2024-08-19 | Stop reason: HOSPADM

## 2024-08-17 RX ORDER — IBUPROFEN 400 MG/1
400 TABLET ORAL
Status: DISCONTINUED | OUTPATIENT
Start: 2024-08-17 | End: 2024-08-19 | Stop reason: HOSPADM

## 2024-08-17 RX ORDER — CALCIUM CARBONATE 500 MG/1
1 TABLET, CHEWABLE ORAL DAILY
Status: DISCONTINUED | OUTPATIENT
Start: 2024-08-17 | End: 2024-08-19 | Stop reason: HOSPADM

## 2024-08-17 RX ADMIN — FAMOTIDINE 20 MG: 20 TABLET, FILM COATED ORAL at 20:06

## 2024-08-17 RX ADMIN — CALCIUM CARBONATE (ANTACID) CHEW TAB 500 MG 500 MG: 500 CHEW TAB at 10:23

## 2024-08-17 RX ADMIN — BUPROPION HYDROCHLORIDE 300 MG: 150 TABLET, EXTENDED RELEASE ORAL at 09:44

## 2024-08-17 RX ADMIN — MECLIZINE 12.5 MG: 12.5 TABLET ORAL at 20:06

## 2024-08-17 RX ADMIN — GABAPENTIN 100 MG: 100 CAPSULE ORAL at 20:06

## 2024-08-17 RX ADMIN — POLYETHYLENE GLYCOL 3350 17 G: 17 POWDER, FOR SOLUTION ORAL at 21:10

## 2024-08-17 RX ADMIN — VENLAFAXINE HYDROCHLORIDE 150 MG: 150 CAPSULE, EXTENDED RELEASE ORAL at 09:44

## 2024-08-17 RX ADMIN — IBUPROFEN 400 MG: 400 TABLET, FILM COATED ORAL at 17:10

## 2024-08-17 RX ADMIN — GABAPENTIN 100 MG: 100 CAPSULE ORAL at 09:44

## 2024-08-17 RX ADMIN — ACETAMINOPHEN 650 MG: 325 TABLET ORAL at 20:06

## 2024-08-17 RX ADMIN — VENLAFAXINE HYDROCHLORIDE 37.5 MG: 37.5 TABLET ORAL at 09:44

## 2024-08-17 RX ADMIN — OXYCODONE HYDROCHLORIDE AND ACETAMINOPHEN 500 MG: 500 TABLET ORAL at 10:24

## 2024-08-17 RX ADMIN — IBUPROFEN 400 MG: 400 TABLET, FILM COATED ORAL at 10:25

## 2024-08-17 RX ADMIN — PANTOPRAZOLE SODIUM 40 MG: 40 TABLET, DELAYED RELEASE ORAL at 05:45

## 2024-08-17 RX ADMIN — ENOXAPARIN SODIUM 40 MG: 100 INJECTION SUBCUTANEOUS at 09:44

## 2024-08-17 RX ADMIN — MECLIZINE 12.5 MG: 12.5 TABLET ORAL at 09:44

## 2024-08-17 RX ADMIN — ANASTROZOLE 1 MG: 1 TABLET, COATED ORAL at 09:44

## 2024-08-17 RX ADMIN — TRAZODONE HYDROCHLORIDE 50 MG: 50 TABLET ORAL at 20:06

## 2024-08-17 RX ADMIN — ASPIRIN 81 MG: 81 TABLET, CHEWABLE ORAL at 09:44

## 2024-08-17 RX ADMIN — FAMOTIDINE 20 MG: 20 TABLET, FILM COATED ORAL at 10:24

## 2024-08-17 ASSESSMENT — PAIN - FUNCTIONAL ASSESSMENT
PAIN_FUNCTIONAL_ASSESSMENT: ACTIVITIES ARE NOT PREVENTED
PAIN_FUNCTIONAL_ASSESSMENT: ACTIVITIES ARE NOT PREVENTED

## 2024-08-17 ASSESSMENT — PAIN DESCRIPTION - PAIN TYPE
TYPE: CHRONIC PAIN
TYPE: CHRONIC PAIN

## 2024-08-17 ASSESSMENT — PAIN SCALES - GENERAL
PAINLEVEL_OUTOF10: 3
PAINLEVEL_OUTOF10: 6
PAINLEVEL_OUTOF10: 3
PAINLEVEL_OUTOF10: 3
PAINLEVEL_OUTOF10: 0

## 2024-08-17 ASSESSMENT — PAIN DESCRIPTION - DESCRIPTORS
DESCRIPTORS: ACHING;THROBBING;SHOOTING;SORE
DESCRIPTORS: ACHING

## 2024-08-17 ASSESSMENT — PAIN DESCRIPTION - FREQUENCY
FREQUENCY: INTERMITTENT
FREQUENCY: INTERMITTENT

## 2024-08-17 ASSESSMENT — PAIN DESCRIPTION - ORIENTATION
ORIENTATION: POSTERIOR;MID
ORIENTATION: LOWER
ORIENTATION: POSTERIOR
ORIENTATION: POSTERIOR

## 2024-08-17 ASSESSMENT — PAIN DESCRIPTION - LOCATION
LOCATION: BACK

## 2024-08-17 ASSESSMENT — PAIN DESCRIPTION - ONSET
ONSET: PROGRESSIVE
ONSET: ON-GOING

## 2024-08-17 NOTE — PROGRESS NOTES
End of Shift Note    Bedside shift change report given to Savita RN  (oncoming nurse) by CALIXTO HANKS.        Shift worked: 7P-7A   Shift summary and any significant changes:    Patient plan for DC is IPR. Waiting for Auth. Meclizine and tylenol X 1.  Can get toradol IM for pain every q6.patient has no iv       Concerns for physician to address:     Zone phone for oncoming shift:        Patient Information  Lizeth Llamas  69 y.o.  8/9/2024  4:36 PM by Shereen Ruiz MD. Lizeth Llamas was admitted from [facility]    Problem List  Patient Active Problem List    Diagnosis Date Noted    Dizziness 08/09/2024     No past medical history on file.    Core Measures:  CVA: no  CHF: no  PNA: no    Activity:  Level of Assistance: Standby assist, set-up cues, supervision of patient - no hands on  Number times ambulated in hallways past shift: 0  Number of times OOB to chair past shift: 2    Cardiac:   Cardiac Monitoring: yes,     Access:   Current line(s): No IV - Patient refuses new one    Respiratory:   O2 Device: None (Room air)    GI:  Last BM (including prior to admit): 08/15/24  Current diet:  ADULT DIET; Regular  Tolerating current diet: Yes    Pain Management:   Patient states pain is manageable on current regimen: yes    Skin:  Kan Scale Score: 20  Interventions: dual skin   Pressure injury: no    Patient Safety:  Fall Score: Gonzalez Total Score: 30  Interventions: stay with me program  Self-release roll belt: No  Dexterity to release roll belt: N/A   (must document dexterity  here by stating Yes or No here, otherwise this is a restraint and must follow restraint documentation policy.)    DVT prophylaxis:  DVT prophylaxis: meds    Active Consults:  IP CONSULT TO HOSPITALIST  IP CONSULT TO PHARMACY  IP CONSULT TO CARDIOLOGY  IP CONSULT TO NEUROLOGY  IP CONSULT TO PSYCHIATRY    Length of Stay:  Expected LOS: 8  Actual LOS: 7    DEMARIO Duong

## 2024-08-18 PROCEDURE — 6370000000 HC RX 637 (ALT 250 FOR IP): Performed by: EMERGENCY MEDICINE

## 2024-08-18 PROCEDURE — 6370000000 HC RX 637 (ALT 250 FOR IP): Performed by: STUDENT IN AN ORGANIZED HEALTH CARE EDUCATION/TRAINING PROGRAM

## 2024-08-18 PROCEDURE — 6370000000 HC RX 637 (ALT 250 FOR IP): Performed by: INTERNAL MEDICINE

## 2024-08-18 PROCEDURE — 1100000003 HC PRIVATE W/ TELEMETRY

## 2024-08-18 RX ORDER — ENEMA 19; 7 G/133ML; G/133ML
1 ENEMA RECTAL
Status: COMPLETED | OUTPATIENT
Start: 2024-08-18 | End: 2024-08-18

## 2024-08-18 RX ADMIN — TRAZODONE HYDROCHLORIDE 50 MG: 50 TABLET ORAL at 20:52

## 2024-08-18 RX ADMIN — FAMOTIDINE 20 MG: 20 TABLET, FILM COATED ORAL at 10:49

## 2024-08-18 RX ADMIN — FAMOTIDINE 20 MG: 20 TABLET, FILM COATED ORAL at 20:52

## 2024-08-18 RX ADMIN — OXYCODONE HYDROCHLORIDE AND ACETAMINOPHEN 500 MG: 500 TABLET ORAL at 10:49

## 2024-08-18 RX ADMIN — ONDANSETRON 4 MG: 4 TABLET, ORALLY DISINTEGRATING ORAL at 18:23

## 2024-08-18 RX ADMIN — IBUPROFEN 400 MG: 400 TABLET, FILM COATED ORAL at 16:37

## 2024-08-18 RX ADMIN — GABAPENTIN 100 MG: 100 CAPSULE ORAL at 20:53

## 2024-08-18 RX ADMIN — IBUPROFEN 400 MG: 400 TABLET, FILM COATED ORAL at 10:49

## 2024-08-18 RX ADMIN — VENLAFAXINE HYDROCHLORIDE 150 MG: 150 CAPSULE, EXTENDED RELEASE ORAL at 10:49

## 2024-08-18 RX ADMIN — CALCIUM CARBONATE (ANTACID) CHEW TAB 500 MG 500 MG: 500 CHEW TAB at 10:49

## 2024-08-18 RX ADMIN — BUPROPION HYDROCHLORIDE 300 MG: 150 TABLET, EXTENDED RELEASE ORAL at 10:49

## 2024-08-18 RX ADMIN — IBUPROFEN 400 MG: 400 TABLET, FILM COATED ORAL at 13:39

## 2024-08-18 RX ADMIN — SODIUM PHOSPHATE, DIBASIC AND SODIUM PHOSPHATE, MONOBASIC 1 ENEMA: 7; 19 ENEMA RECTAL at 17:06

## 2024-08-18 RX ADMIN — MECLIZINE 12.5 MG: 12.5 TABLET ORAL at 16:42

## 2024-08-18 RX ADMIN — POLYETHYLENE GLYCOL 3350 17 G: 17 POWDER, FOR SOLUTION ORAL at 20:52

## 2024-08-18 RX ADMIN — GLYCERIN 2 G: 2 SUPPOSITORY RECTAL at 12:22

## 2024-08-18 RX ADMIN — VENLAFAXINE HYDROCHLORIDE 37.5 MG: 37.5 TABLET ORAL at 10:49

## 2024-08-18 RX ADMIN — ASPIRIN 81 MG: 81 TABLET, CHEWABLE ORAL at 10:49

## 2024-08-18 RX ADMIN — ANASTROZOLE 1 MG: 1 TABLET, COATED ORAL at 10:50

## 2024-08-18 RX ADMIN — GABAPENTIN 100 MG: 100 CAPSULE ORAL at 10:49

## 2024-08-18 RX ADMIN — PANTOPRAZOLE SODIUM 40 MG: 40 TABLET, DELAYED RELEASE ORAL at 05:36

## 2024-08-18 ASSESSMENT — PAIN SCALES - GENERAL
PAINLEVEL_OUTOF10: 0
PAINLEVEL_OUTOF10: 3
PAINLEVEL_OUTOF10: 4
PAINLEVEL_OUTOF10: 4

## 2024-08-18 ASSESSMENT — PAIN DESCRIPTION - LOCATION
LOCATION: BUTTOCKS;HIP
LOCATION: BUTTOCKS;HIP
LOCATION: HIP

## 2024-08-18 ASSESSMENT — PAIN DESCRIPTION - ORIENTATION
ORIENTATION: LEFT

## 2024-08-18 NOTE — PROGRESS NOTES
End of Shift Note    Bedside shift change report given to (oncoming nurse) by CALIXTO HANKS.        Shift worked: 7P-7A   Shift summary and any significant changes:    Patient plan for DC is IPR. Waiting for Auth. Meclizine and tylenol X 1. Last BM 8/15 miralax given        Concerns for physician to address:     Zone phone for oncoming shift:        Patient Information  Lizeth Llamas  69 y.o.  8/9/2024  4:36 PM by Shereen Ruiz MD. Lizeth Llamas was admitted from [facility]    Problem List  Patient Active Problem List    Diagnosis Date Noted    Dizziness 08/09/2024     No past medical history on file.    Core Measures:  CVA: no  CHF: no  PNA: no    Activity:  Level of Assistance: Standby assist, set-up cues, supervision of patient - no hands on  Number times ambulated in hallways past shift: 0  Number of times OOB to chair past shift: 2    Cardiac:   Cardiac Monitoring: yes,     Access:   Current line(s): No IV - Patient refuses new one    Respiratory:   O2 Device: None (Room air)    GI:  Last BM (including prior to admit): 08/15/24  Current diet:  ADULT DIET; Regular  Tolerating current diet: Yes    Pain Management:   Patient states pain is manageable on current regimen: yes    Skin:  Kan Scale Score: 20  Interventions: dual skin   Pressure injury: no    Patient Safety:  Fall Score: Gonzalez Total Score: 25  Interventions: stay with me program  Self-release roll belt: No  Dexterity to release roll belt: N/A   (must document dexterity  here by stating Yes or No here, otherwise this is a restraint and must follow restraint documentation policy.)    DVT prophylaxis:  DVT prophylaxis: meds    Active Consults:  IP CONSULT TO HOSPITALIST  IP CONSULT TO PHARMACY  IP CONSULT TO CARDIOLOGY  IP CONSULT TO NEUROLOGY  IP CONSULT TO PSYCHIATRY    Length of Stay:  Expected LOS: 8  Actual LOS: 8    DEMARIO Duong

## 2024-08-18 NOTE — PROGRESS NOTES
End of Shift Note     Bedside shift change report given to DEMARIO Urban  (oncoming nurse) by Savita Ruff RN.          Shift worked: days   Shift summary and any significant changes:     Complaints of constipation throughout the day. Suppository and fleets enema given with no success. Soap suds enema given at change of shift         Concerns for physician to address:     Zone phone for oncoming shift:         Patient Information  Lizeth Llamas  69 y.o.  8/9/2024  4:36 PM by Shereen Ruiz MD. Lizeth Llamas was admitted from [facility]     Problem List       Patient Active Problem List     Diagnosis Date Noted    Dizziness 08/09/2024      Past Medical History   No past medical history on file.        Core Measures:  CVA: no  CHF: no  PNA: no     Activity:  Level of Assistance: Standby assist, set-up cues, supervision of patient - no hands on  Number times ambulated in hallways past shift: 0  Number of times OOB to chair past shift: 2     Cardiac:   Cardiac Monitoring: yes,      Access:   Current line(s): No IV - Patient refuses new one     Respiratory:   O2 Device: None (Room air)     GI:  Last BM (including prior to admit): 08/15/24  Current diet:  ADULT DIET; Regular  Tolerating current diet: Yes     Pain Management:   Patient states pain is manageable on current regimen: yes     Skin:  Kan Scale Score: 20  Interventions: dual skin   Pressure injury: no    Patient Safety:  Fall Score: Gonzalez Total Score: 30  Interventions: stay with me program  Self-release roll belt: No  Dexterity to release roll belt: N/A   (must document dexterity  here by stating Yes or No here, otherwise this is a restraint and must follow restraint documentation policy.)     DVT prophylaxis:  DVT prophylaxis: meds     Active Consults:  IP CONSULT TO HOSPITALIST  IP CONSULT TO PHARMACY  IP CONSULT TO CARDIOLOGY  IP CONSULT TO NEUROLOGY  IP CONSULT TO PSYCHIATRY     Length of Stay:  Expected LOS: 8  Actual LOS: 7

## 2024-08-19 VITALS
OXYGEN SATURATION: 98 % | HEIGHT: 60 IN | HEART RATE: 88 BPM | BODY MASS INDEX: 40.82 KG/M2 | TEMPERATURE: 98.8 F | SYSTOLIC BLOOD PRESSURE: 148 MMHG | RESPIRATION RATE: 18 BRPM | DIASTOLIC BLOOD PRESSURE: 68 MMHG | WEIGHT: 207.89 LBS

## 2024-08-19 PROCEDURE — 6370000000 HC RX 637 (ALT 250 FOR IP): Performed by: INTERNAL MEDICINE

## 2024-08-19 PROCEDURE — 6370000000 HC RX 637 (ALT 250 FOR IP): Performed by: STUDENT IN AN ORGANIZED HEALTH CARE EDUCATION/TRAINING PROGRAM

## 2024-08-19 PROCEDURE — 97116 GAIT TRAINING THERAPY: CPT

## 2024-08-19 PROCEDURE — 6370000000 HC RX 637 (ALT 250 FOR IP): Performed by: EMERGENCY MEDICINE

## 2024-08-19 RX ORDER — ONDANSETRON 4 MG/1
4 TABLET, FILM COATED ORAL 3 TIMES DAILY PRN
Qty: 15 TABLET | Refills: 0 | Status: SHIPPED | OUTPATIENT
Start: 2024-08-19

## 2024-08-19 RX ORDER — PSEUDOEPHEDRINE HCL 30 MG
100 TABLET ORAL DAILY
Qty: 30 CAPSULE | Refills: 0 | Status: SHIPPED | OUTPATIENT
Start: 2024-08-19

## 2024-08-19 RX ORDER — LACTULOSE 10 G/15ML
20 SOLUTION ORAL 3 TIMES DAILY PRN
Status: DISCONTINUED | OUTPATIENT
Start: 2024-08-19 | End: 2024-08-19 | Stop reason: HOSPADM

## 2024-08-19 RX ORDER — LACTULOSE 10 G/15ML
10 SOLUTION ORAL 3 TIMES DAILY PRN
Qty: 237 ML | Refills: 0 | Status: SHIPPED | OUTPATIENT
Start: 2024-08-19

## 2024-08-19 RX ORDER — DOCUSATE SODIUM 100 MG/1
100 CAPSULE, LIQUID FILLED ORAL DAILY
Status: DISCONTINUED | OUTPATIENT
Start: 2024-08-19 | End: 2024-08-19 | Stop reason: HOSPADM

## 2024-08-19 RX ADMIN — VENLAFAXINE HYDROCHLORIDE 37.5 MG: 37.5 TABLET ORAL at 08:30

## 2024-08-19 RX ADMIN — IBUPROFEN 400 MG: 400 TABLET, FILM COATED ORAL at 08:30

## 2024-08-19 RX ADMIN — IBUPROFEN 400 MG: 400 TABLET, FILM COATED ORAL at 11:51

## 2024-08-19 RX ADMIN — CALCIUM CARBONATE (ANTACID) CHEW TAB 500 MG 500 MG: 500 CHEW TAB at 08:31

## 2024-08-19 RX ADMIN — POLYETHYLENE GLYCOL 3350 17 G: 17 POWDER, FOR SOLUTION ORAL at 09:41

## 2024-08-19 RX ADMIN — MECLIZINE 12.5 MG: 12.5 TABLET ORAL at 09:40

## 2024-08-19 RX ADMIN — LACTULOSE 20 G: 10 SOLUTION ORAL at 10:44

## 2024-08-19 RX ADMIN — GABAPENTIN 100 MG: 100 CAPSULE ORAL at 08:31

## 2024-08-19 RX ADMIN — FAMOTIDINE 20 MG: 20 TABLET, FILM COATED ORAL at 08:30

## 2024-08-19 RX ADMIN — ANASTROZOLE 1 MG: 1 TABLET, COATED ORAL at 08:31

## 2024-08-19 RX ADMIN — VENLAFAXINE HYDROCHLORIDE 150 MG: 150 CAPSULE, EXTENDED RELEASE ORAL at 08:30

## 2024-08-19 RX ADMIN — DOCUSATE SODIUM 100 MG: 100 CAPSULE, LIQUID FILLED ORAL at 10:44

## 2024-08-19 RX ADMIN — ACETAMINOPHEN 650 MG: 325 TABLET ORAL at 08:31

## 2024-08-19 RX ADMIN — OXYCODONE HYDROCHLORIDE AND ACETAMINOPHEN 500 MG: 500 TABLET ORAL at 08:30

## 2024-08-19 RX ADMIN — PANTOPRAZOLE SODIUM 40 MG: 40 TABLET, DELAYED RELEASE ORAL at 06:21

## 2024-08-19 RX ADMIN — BUPROPION HYDROCHLORIDE 300 MG: 150 TABLET, EXTENDED RELEASE ORAL at 08:30

## 2024-08-19 RX ADMIN — ASPIRIN 81 MG: 81 TABLET, CHEWABLE ORAL at 08:30

## 2024-08-19 ASSESSMENT — PAIN DESCRIPTION - LOCATION
LOCATION: BACK
LOCATION: BACK

## 2024-08-19 ASSESSMENT — PAIN DESCRIPTION - DESCRIPTORS
DESCRIPTORS: ACHING
DESCRIPTORS: ACHING

## 2024-08-19 ASSESSMENT — PAIN SCALES - GENERAL
PAINLEVEL_OUTOF10: 0
PAINLEVEL_OUTOF10: 4
PAINLEVEL_OUTOF10: 6
PAINLEVEL_OUTOF10: 3

## 2024-08-19 ASSESSMENT — PAIN DESCRIPTION - ORIENTATION
ORIENTATION: MID
ORIENTATION: MID

## 2024-08-19 NOTE — CARE COORDINATION
Transition of Care Plan:    RUR: 6    Prior Level of Functioning: I with ADLs.     Disposition: Plan A: IPR- Pending  Plan B SNF - [facility]   Insurance auth will need to be secured.     1 PM   Therapy reported that they think Pt can tolerate IPR.  Notes will be in shortly.  CM talked to Pt/DTR and they are in agreement to see if we can get her into [facility] and then will need to see if [facility] Medicare will approve.     If they will not Plan B would be SNF at [facility].     CM called [facility] and let them know to hold off on starting authorization as DC plan is now for IPR.     10:57 AM  Plan for SNF - [facility]  CM completed chart review.   CM called [facility] and they can accept her.  Asked Corinne to go ahead and start authorization today with BC/BS Medicare. Updated therapy notes should be in soon for auth.    If SNF or IPR: Date FOC offered: 8/10  Date FOC received: 8/10  Accepting facility: [facility] 8/12  Date authorization started with reference number: Auth started with BC/BS Medicare 8/12 once updated therapy notes are in. - Pending  Date authorization received and expires:     Follow up appointments: after rehab.   DME needed: n/a  Transportation at discharge: Son/DTR  IM/IMM Medicare/ letter given: 2nd IM letter to be delivered prior to DC.   Is patient a  and connected with VA? N/a   If yes, was [place] transfer form completed and VA notified?   Caregiver Contact: Carli SEGUNDO: [phone]  Discharge Caregiver contacted prior to discharge? yes  Care Conference needed? N/a  Barriers to discharge: Cardiology Clearance  Auth from BC/BS Medicare for SNF.     Nicolasa Betancur CM  [phone]    
Transition of Care Plan:    RUR: 6% (low RUR)   Prior Level of Functioning: Independent  Disposition: IPR - Pending vs SNF - [facility]  If SNF or IPR: Date FOC offered: 08/10  Date FOC received: 08/10  Accepting facility: [facility]  Date authorization started with reference number: Will need BCBS auth   Date authorization received and expires: Pending auth start   Follow up appointments: defer to rehab vs to be arranged  DME needed: defer to rehab vs none at this time  Transportation at discharge: Son or DTR to transport  IM/[facility] Medicare/ letter given: Last given 08/09  Is patient a  and connected with VA? N/a   If yes, was  transfer form completed and VA notified? N/a  Caregiver Contact: DTR, Carli Yon: [phone]   Discharge Caregiver contacted prior to discharge? CM to contact  Care Conference needed? Not at this time  Barriers to discharge: Medical clearance, IPR placement, auth     0858 - Assumed transitions of care planning from ADILENE Betancur. Chart reviewed.    Plan A: IPR - Referrals pending. Pt will need BCBS auth.   Plan B: SNF - [facility] has accepted. Will need BCBS auth.     Pending IPR acceptance and auth start.    Family anticipated to transport.     1117 - No updates yet from IPR.     1615 - [facility] denied, [facility] reviewing new therapy notes.     SEB Lezama  Care Management  [facility]  [phone]    
Transition of Care Plan:    RUR: 6% (low RUR)   Prior Level of Functioning: Independent  Disposition: Plan A: IPR - Denied  Plan B: SNF - [facility] accepted  If SNF or IPR: Date FOC offered: 08/10  Date FOC received: 08/10  Accepting facility: [facility]  Date authorization started with reference number: BCBS auth started by ADILENE 08/14, pending auth ID#: [id number]   Date authorization received and expires: Auth pending  Follow up appointments: defer to rehab vs to be arranged  DME needed: defer to rehab vs none at this time  Transportation at discharge: family to transport  IM/IMM Medicare/ letter given: last given 08/13  Is patient a [place] and connected with VA? N/a   If yes, was [place] transfer form completed and VA notified? N/a  Caregiver Contact: SANYA Carli Marvin: [phone]   Discharge Caregiver contacted prior to discharge? CM to contact  Care Conference needed? Not at this time  Barriers to discharge: Auth, medical clearance    0810 - Chart reviewed. Plan to d/c to IPR. [facility] denied, [facility] still reviewing. [facility] SNF following for plan B. CM requested update from [facility]. Will update pt. Pt will need BCBS auth. Goal to start auth today. Family to transport.     0840 - [facility] declined. CMA starting auth for [facility]. Updated patient, expressed understanding and agreement.     1508 - No updates on auth yet, CM made pt aware.     Sona Fragoso, MSW  Care Management  [facility]  [phone]    
Transition of Care Plan:    RUR: 6% (low RUR)   Prior Level of Functioning: Independent  Disposition: Plan A: SNF - [facility], auth pending  Plan B: Home with HH and caregivers   If SNF or IPR: Date FOC offered: 08/10  Date FOC received: 08/10  Accepting facility: [facility]  Date authorization started with reference number: BCBS auth started by ADILENE 08/14, pending auth ID#: [id number]   Date authorization received and expires: Auth pending   Follow up appointments: defer to rehab vs to be arranged  DME needed: defer to rehab vs none at this time  Transportation at discharge: family to transport   IM/IMM Medicare/ letter given: Last given 08/14  Is patient a [place] and connected with VA? N/a   If yes, was  transfer form completed and VA notified? N/a  Caregiver Contact: SANYA Carli Marvin: [phone]   Discharge Caregiver contacted prior to discharge? Pt to contact  Care Conference needed? Not at this time  Barriers to discharge: Auth    0807 - Chart reviewed. Plan remains SNF @ [facility]. Auth still pending. CMA following for updates. Family to transport at d/c.     1413 - No updates on auth at this time. CM updated patient.     1527 - Acknowleding dc order. Auth still pending at this time. Avoidable days placed. Updated pt and discussed plan B. Pt would return home with HH and private duty care. Pt knows of a private caregiver. Pt also reports her family is supportive and can check in on her, but they do all work.     SEB Lezama  Care Management  [facility]  [phone]    
Transition of Care Plan:    RUR: 7% (low RUR)   Prior Level of Functioning: Independent   Disposition:  Plan A: SNF - [facility], auth pending  Plan B: Home with HH and caregivers   If SNF or IPR: Date FOC offered: 08/10  Date FOC received: 08/10  Accepting facility: [facility]  Date authorization started with reference number: BCBS auth started by ADILENE 08/14, pending auth ID#: [id number]   Date authorization received and expires: Pending  Follow up appointments: defer to SNF vs to be arranged  DME needed: defer to SNF vs none at this time  Transportation at discharge: Family to transport  IM/IMM Medicare/ letter given: Last given 08/16  Is patient a [place] and connected with VA? N/a   If yes, was [place] transfer form completed and VA notified? N/a  Caregiver Contact:  SANYA Carli Gowrie: [phone]   Discharge Caregiver contacted prior to discharge? Pt to contact  Care Conference needed? Not at this time  Barriers to discharge: Auth     0806 - Chart reviewed. Plan remains SNF @ [facility]. Auth still pending at this time. CMA following for updates. Could go P2P. Plan B for home with HH and private duty care. CM continuing to follow.     1633 - Still no updates on auth. CM updated pt and son at bedside.     SEB Lezama  Care Management  [facility]  [phone]    
Transition of Care Plan:    RUR: 7% (low RUR)   Prior Level of Functioning: Independent  Disposition: Home with HH  - [facility] PT/OT/SN  If SNF or IPR: Date FOC offered: 08/10  Date FOC received: 08/10  Accepting facility: [facility]  Date authorization started with reference number: BCBS auth started by CM 08/14, pending auth ID#: [id number]   Date authorization received and expires: Denied  Follow up appointments: to be arranged  DME needed: None at this time  Transportation at discharge: Family to transport  IM/IMM Medicare/ letter given: Last given 08/19  Is patient a [place] and connected with VA? N/a    If yes, was [place] transfer form completed and VA notified? N/a  Caregiver Contact: SANYA Carlisilas Marvin: [phone]   Discharge Caregiver contacted prior to discharge? Pt to contact  Care Conference needed? Not at this time  Barriers to discharge: HH set-up    0808 - Chart reviewed from weekend. Pt remains waiting for SNF auth to [facility]. [facility] checking for updates on auth this morning, has called directly. Avoidable days placed for significant delays.     1152 - Made aware by [facility] that [facility] denied auth on 08/15. [facility] did not communicate with CM and pt's auth still shows \"Pending\" in portal. No denial letter was faxed or uploaded. [facility] claiming delay and miscommunication is d/t a software update.     CM made pt aware. She is agreeable to d/c home today with HH. No preference in agency as long as they are in network with an appropriate SOC. Referrals placed. Daughter to transport ~ 1400.     1339 - No accepting agencies yet. CM sent additional referrals. Contacted [facility] requested review.     1348 - [facility] accepted for SOC next day. Info added to AVS. Pt is clear from CM for d/c.        08/19/24 1156   Services At/After Discharge   Transition of Care Consult (CM Consult) Home Health   Services At/After Discharge Home Health;PT;OT;Nursing services;Community Resources   [place] 
Transition of Care Plan:    RUR: 7%-\"Low Risk\"  Prior Level of Functioning: Independent in her ADLs and IADLs  Disposition: Home with Home Health Services-the patient stated that she was supposed to be starting outpatient PT next week but she reports ongoing issues with transportation as she has not been driving long distances. She expressed interest in CM arranging for HH on d/c   If SNF or IPR: Date FOC offered: N/A  Follow up appointments: PCP & Specialists as indicated   DME needed: The patient owns a cane, front-wheeled walker, and a CPAP  Transportation at discharge: The patient reported that either her daughter or son will transport her home on d/c   IM/IMM Medicare/ letter given: To be given on d/c   Is patient a  and connected with VA? N/A   If yes, was  transfer form completed and VA notified?   Caregiver Contact: Brennan Llamas, Son, Phone: [phone] OR Carli Marvin, Daughter, Phone: [phone]  Discharge Caregiver contacted prior to discharge? CM will contact her son or daughter if she requests this   Care Conference needed? No  Barriers to discharge: Pending medical stability, PT/OT evals     The patient uses the [facility] on [address] for her medications. She has had HH services in the past but she could not remember the name of the agency. She has never been to a SNF/IPR facility in the past. The patient stated that she was scheduled to start outpatient PT next week but she is concerned with this as she is only driving very short distances currently and her family is not always able to assist with transportation needs. She expressed interest in CM arranging for HH services on d/c. CM is awaiting final PT/OT recs prior to arranging for HH services.      08/10/24 4725   Service Assessment   Patient Orientation Alert and Oriented   Cognition Alert   History Provided By Patient   Primary Caregiver Self   Support Systems Children;Family Members  (The patient lives alone. 
support to provide supervision/in-home care upon return home at the currently level of care she requires.     Savita Topete LCSW  [facility] Care Manager  [phone]

## 2024-08-19 NOTE — PROGRESS NOTES
Patient discharged home with all belongings. LDAs removed. Discharge education and follow up information reviewed with patient. Patient verbalized understanding.

## 2024-08-19 NOTE — PROGRESS NOTES
Attempted to schedule hospital follow up PCP appointment. Office  will contact the patient with appointment information per office protocol. [facility] placed [facility] information AVS for patient resource. Pending patient discharge. Kim Garcia, Care Management Assistant

## 2024-08-19 NOTE — ADT AUTH CERT
Progress Notes by Teri Loya MD at 2024  8:49 AM    Author: Teri Loya MD Service: Hospitalist Author Type: Physician   Filed: 2024  2:38 PM Date of Service: 2024  8:49 AM Status: Signed   : Teri Loya MD (Physician)           Hospitalist Progress Note     NAME:              Lizeth WOOD Depersis   :   1955   MRN:   [id number]      Date/Time: 2024 8:50 AM  Patient PCP: Hugh Leroy MD     Estimated discharge date:   Barriers: Placement        Assessment / Plan:  Chest pain-etiology to be determined  Dizziness  Admit to medical telemetry  Troponin 4-4, EKG sinus rhythm, RBBB-no prior EKG for comparison  CT head and neck-no evidence of acute intracranial abnormality, no evidence of significant stenosis or aneurysm CTA head and neck  Chest x-ray no acute cardiopulmonary process  Chest pain nonreproducible on examination vs  possible anxiety versus GERD  Orthostatic in ED was negative after NS bolus  Ordered orthostatic in a.m.  Meclizine as needed for dizziness  Low risk for PE, ordered D-dimer  PT/OT eval  If persistent chest pain-May need to consider cardiology eval, patient follows up with Dr. Sandoval  Pharmacy consulted for medical reconciliation  8/10: Patient denies any chest pain currently.  Reports that her dizziness is persistent along with sweating.  I spoke with neurology Dr. Alcantara and he agrees with getting MRI of the brain.  Meanwhile her troponin trend is 4, 4 and 5 which is essentially normal.  Echocardiogram has been ordered.  Will also consult cardiology for further input.  : MRI brain negative for any acute intracranial abnormality.  CT angiogram head and neck negative for large vessel occlusion.  I spoke with neurology Dr. Alcantara, they have already signed off.  Appreciate input from cardiology, plan is to get echocardiogram and nuclear stress test tomorrow.  N.p.o. after midnight for the same.  : Echocardiogram was done yesterday and

## 2024-08-19 NOTE — PROGRESS NOTES
End of Shift Note    Bedside shift change report given to Darleen HANKS (oncoming nurse) by Wilmar Brown RN (offgoing nurse).  Report included the following information SBAR, Kardex, Intake/Output, MAR, and Recent Results    Shift worked:  Nights       Shift summary and any significant changes:     none     Concerns for physician to address:  none     Zone phone for oncoming shift:   [phone]       Activity:     Number times ambulated in hallways past shift: 0  Number of times OOB to chair past shift: 0    Cardiac:   Cardiac Monitoring: No           Access:  Current line(s): no access     Genitourinary:   Urinary status: voiding    Respiratory:      Chronic home O2 use?: NO  Incentive spirometer at bedside: YES       GI:     Current diet:  ADULT DIET; Regular  Passing flatus: YES  Tolerating current diet: YES       Pain Management:   Patient states pain is manageable on current regimen: YES    Skin:     Interventions: float heels and increase time out of bed    Patient Safety:  Fall Score:    Interventions: bed/chair alarm       Length of Stay:  Expected LOS: 8  Actual LOS: 10      Wilmar Brown RN

## 2024-08-19 NOTE — PROGRESS NOTES
Physician Progress Note      PATIENT:               RENNY POTTS  [facility] #:                  [id number]  :                       1955  ADMIT DATE:       2024 4:36 PM  DISCH DATE:  RESPONDING  PROVIDER #:        Vanessa Farfan MD          QUERY TEXT:    Pt admitted with chest pain and dizziness. Please document in progress notes   and discharge summary the etiology of the chest pain and dizziness.    The medical record reflects the following:    Risk Factors: 69-year-old female with anxiety, depression, neuropathy, GERD    Clinical Indicators:  --troponins: 4, 4 and 5  --Right bundle branch block on EKG  --Chest x-ray 24: no acute cardiopulmonary process  --MRI brain on 8/10/24: no intracranial mass, hemorrhage or evidence of acute   infarction.  --Neg nuclear stress on 24: LVEF 57%, does not show any ischemia or   infarct  --Echo from 24 with EF of 55 - 60%. Left atrium is mildly dilated    Treatment: Admit to medical telemetry, cardio/neuro consults, serial   troponins, EKG, head CT, CTA, MRI of brain, nuclear stress test, echo,   Meclizine as needed for dizziness.   IPR - Pending vs SNF - [facility]      Thank you,  Yfn Regalado RN, CDI, CCDS  Options provided:  -- chest pain and dizziness due to, please document etiology  -- Other - I will add my own diagnosis  -- Disagree - Not applicable / Not valid  -- Disagree - Clinically unable to determine / Unknown  -- Refer to Clinical Documentation Reviewer    PROVIDER RESPONSE TEXT:    Provider is clinically unable to determine a response to this query.  resolved, unable to determine    Query created by: YFN REGALADO on 2024 1:57 PM      Electronically signed by:  Vanessa Farfan MD 2024 8:21 AM

## 2024-08-19 NOTE — PLAN OF CARE
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  See evaluation for individualized goals.  Outcome: Progressing  Note: FUNCTIONAL STATUS PRIOR TO ADMISSION: Patient was modified independent using a rolling walker for functional mobility. Patient had 2 TKRs in last few years, then lumbar fusion, then just recently kyphoplasty due to compression fx.  She never really got off of her rolling walker due to above issues.  In recent past pt has lost her dog, her parents and her .  She has a daughter and a son and some grandchildren who have helped her this summer.    HOME SUPPORT PRIOR TO ADMISSION: The patient lived alone with daughter and grandchildren to provide assistance.    Physical Therapy Goals  Initiated 8/10/2024  1.  Patient will move from supine to sit and sit to supine in bed with independence within 7 day(s).    2.  Patient will perform sit to stand with modified independence within 7 day(s).  3.  Patient will transfer from bed to chair and chair to bed with modified independence using the least restrictive device within 7 day(s).  4.  Patient will ambulate with modified independence for 100 feet with the least restrictive device within 7 day(s).   5.  Patient will ascend/descend 4 stairs with 1 handrail(s) with contact guard assist within 7 day(s).      PHYSICAL THERAPY EVALUATION    Patient: Lizeth Llamas (69 y.o. female)  Date: 8/10/2024  Primary Diagnosis: Dizziness [R42]  Chest pain, unspecified type [R07.9]       Precautions: Restrictions/Precautions: Bed Alarm, Fall Risk         Spinal Precautions: No Bending, No Lifting, No Twisting (recent kyphoplasty and previous fusion)            ASSESSMENT :   DEFICITS/IMPAIRMENTS:   The patient is limited by decreased functional mobility, strength, activity tolerance, endurance, balance, proprioception, and possible vertigo as she has typical symptoms of nausea, 
Predictive Model Details          27 (Normal)  Factor Value    Calculated 8/13/2024 15:10 43% Age 69 years old    Deterioration Index Model 35% Neurological exam X     15% Respiratory rate 14     4% Systolic 126     1% Pulse oximetry 94 %     1% Pulse 64     1% Hematocrit 43.5 %     0% Temperature 97.5 °F (36.4 °C)     0% Sodium 141 mmol/L     0% Potassium 3.9 mmol/L     0% WBC count 6.0 K/uL        Problem: Discharge Planning  Goal: Discharge to home or other facility with appropriate resources  Outcome: Progressing     Problem: Safety - Adult  Goal: Free from fall injury  Outcome: Progressing     Problem: Pain  Goal: Verbalizes/displays adequate comfort level or baseline comfort level  Outcome: Progressing     Problem: Skin/Tissue Integrity  Goal: Absence of new skin breakdown  Description: 1.  Monitor for areas of redness and/or skin breakdown  2.  Assess vascular access sites hourly  3.  Every 4-6 hours minimum:  Change oxygen saturation probe site  4.  Every 4-6 hours:  If on nasal continuous positive airway pressure, respiratory therapy assess nares and determine need for appliance change or resting period.  Outcome: Progressing     
from your back to your side while in a flat bed without using bedrails?: None  How much help is needed moving from lying on your back to sitting on the side of a flat bed without using bedrails?: None  How much help is needed moving to and from a bed to a chair?: None  How much help is needed standing up from a chair using your arms?: None  How much help is needed walking in hospital room?: None  How much help is needed climbing 3-5 steps with a railing?: A Little    AM-PAC Inpatient Mobility Raw Score : 23  AM-PAC Inpatient T-Scale Score : 56.93     Cutoff score ?171,2,3 had higher odds of discharging home with home health or need of SNF/IPR.    1. Silke Mason, Korin Palomino, Nazario Sequeira, Nalini Woodall, Delio Sutherland, Alfonso Mason.  Validity of the AM-PAC “6-Clicks” Inpatient Daily Activity and Basic Mobility Short Forms. Physical Therapy Mar 2014, 94 3) [phone]; DOI: 10.2522/ptj.57238287  2. Danilo PIPER, Gómez J, Gianan J, Laura J. Association of AM-PAC \"6-Clicks\" Basic Mobility and Daily Activity Scores With Discharge Destination. Phys Ther. 2021 Apr 4;101(4):oupt981. doi: 10.1093/ptj/xajr470. PMID: 22521544.  3. Nenita CHENG, Collin D, Saritha S, Solomon K, Rosa S. Activity Measure for Post-Acute Care \"6-Clicks\" Basic Mobility Scores Predict Discharge Destination After Acute Care Hospitalization in Select Patient Groups: A Retrospective, Observational Study. Arch Rehabil Res Clin Transl. 2022 Jul 16;4(3):487557. doi: 10.1016/j.arrct.2022.342301. PMID: 94989166; PMCID: [id number].  4. Marlon MONTALVO, Erica S, Mulugeta RODRIGUEZ, Lorna SPIVEY. AM-PAC Short Forms Manual 4.0. Revised 2/2020.                                                                                                                                                                                                                               Pain Rating:  Back pain when performing bed mobility   Pain Intervention(s):   repositioning and pain is

## 2024-08-19 NOTE — DISCHARGE SUMMARY
Discharge Summary    Name: Lizeht Llamas  [id number]  YOB: 1955 (Age: 69 y.o.)   Date of Admission: 8/9/2024  Date of Discharge: 8/19/2024  Attending Physician: Vanessa Farfan MD    Discharge Diagnosis:     Chest pain-etiology to be determined  Dizziness  History of bilateral mastectomy due to breast cancer  History of cholecystectomy  Anxiety  Depression  Neuropathy  GERD  Constipation    Consultations:  IP CONSULT TO HOSPITALIST  IP CONSULT TO PHARMACY  IP CONSULT TO CARDIOLOGY  IP CONSULT TO NEUROLOGY  IP CONSULT TO PSYCHIATRY  IP CONSULT TO CASE MANAGEMENT      Brief Admission History/Reason for Admission Per Shereen Ruiz MD:     Lizeth Llamas is a 69 y.o.  female with PMHx presented to ED with chest pain that started today while she was sitting at home.  She was trying to move her legs up in the chair then she suddenly had central chest pain associated with nausea, diaphoresis, dizziness.  Chest pain lasted for around 10 minutes and slowly went away.  Chest pain is intermittent but no radiation.  Denies prior heart issues and prior similar episodes.  Patient has undergone multiple emotional distress since few years.  She also felt dizzy while sitting at the edge of the bed in ED.  States spinning of room ,did not pass out.  She has history of vertigo 15 years ago.  But it did not feel like vertigo at this time.           We were asked to admit for work up and evaluation of the above problems.     Brief Hospital Course by Main Problems:     Chest pain-etiology to be determined  Dizziness     Troponin 4-4, EKG sinus rhythm, RBBB-no prior EKG for comparison  CT head and neck-no evidence of acute intracranial abnormality, no evidence of significant stenosis or aneurysm CTA head and neck  Chest x-ray no acute cardiopulmonary process  Chest pain nonreproducible on examination vs  possible anxiety versus GERD  Orthostatic in ED was negative after NS 
                                  Discharge Summary    Name: Lizeth Llamas  [id number]  YOB: 1955 (Age: 69 y.o.)   Date of Admission: 8/9/2024  Date of Discharge: 8/16/2024  Attending Physician: Vanessa Farfan MD    Discharge Diagnosis:     Chest pain-etiology to be determined  Dizziness  History of bilateral mastectomy due to breast cancer  History of cholecystectomy  Anxiety  Depression  Neuropathy  GERD    Consultations:  IP CONSULT TO HOSPITALIST  IP CONSULT TO PHARMACY  IP CONSULT TO CARDIOLOGY  IP CONSULT TO NEUROLOGY  IP CONSULT TO PSYCHIATRY      Brief Admission History/Reason for Admission Per Shreeen Ruiz MD:     Lizeth Llamas is a 69 y.o.  female with PMHx presented to ED with chest pain that started today while she was sitting at home.  She was trying to move her legs up in the chair then she suddenly had central chest pain associated with nausea, diaphoresis, dizziness.  Chest pain lasted for around 10 minutes and slowly went away.  Chest pain is intermittent but no radiation.  Denies prior heart issues and prior similar episodes.  Patient has undergone multiple emotional distress since few years.  She also felt dizzy while sitting at the edge of the bed in ED.  States spinning of room ,did not pass out.  She has history of vertigo 15 years ago.  But it did not feel like vertigo at this time.           We were asked to admit for work up and evaluation of the above problems.     Brief Hospital Course by Main Problems:     Chest pain-etiology to be determined  Dizziness     Troponin 4-4, EKG sinus rhythm, RBBB-no prior EKG for comparison  CT head and neck-no evidence of acute intracranial abnormality, no evidence of significant stenosis or aneurysm CTA head and neck  Chest x-ray no acute cardiopulmonary process  Chest pain nonreproducible on examination vs  possible anxiety versus GERD  Orthostatic in ED was negative after NS bolus  Ordered orthostatic in a.m.  Meclizine as 
                                  Discharge Summary    Name: Lizeth Llamas  [id number]  YOB: 1955 (Age: 69 y.o.)   Date of Admission: 8/9/2024  Date of Discharge: 8/15/2024  Attending Physician: Vanessa Farfan MD    Discharge Diagnosis:     Chest pain-etiology to be determined  Dizziness  History of bilateral mastectomy due to breast cancer  History of cholecystectomy  Anxiety  Depression  Neuropathy  GERD    Consultations:  IP CONSULT TO HOSPITALIST  IP CONSULT TO PHARMACY  IP CONSULT TO CARDIOLOGY  IP CONSULT TO NEUROLOGY  IP CONSULT TO PSYCHIATRY      Brief Admission History/Reason for Admission Per Shereen Ruiz MD:     Lizeth Llamas is a 69 y.o.  female with PMHx presented to ED with chest pain that started today while she was sitting at home.  She was trying to move her legs up in the chair then she suddenly had central chest pain associated with nausea, diaphoresis, dizziness.  Chest pain lasted for around 10 minutes and slowly went away.  Chest pain is intermittent but no radiation.  Denies prior heart issues and prior similar episodes.  Patient has undergone multiple emotional distress since few years.  She also felt dizzy while sitting at the edge of the bed in ED.  States spinning of room ,did not pass out.  She has history of vertigo 15 years ago.  But it did not feel like vertigo at this time.           We were asked to admit for work up and evaluation of the above problems.     Brief Hospital Course by Main Problems:     Chest pain-etiology to be determined  Dizziness     Troponin 4-4, EKG sinus rhythm, RBBB-no prior EKG for comparison  CT head and neck-no evidence of acute intracranial abnormality, no evidence of significant stenosis or aneurysm CTA head and neck  Chest x-ray no acute cardiopulmonary process  Chest pain nonreproducible on examination vs  possible anxiety versus GERD  Orthostatic in ED was negative after NS bolus  Ordered orthostatic in a.m.  Meclizine as 
needed for dizziness  Low risk for PE, ordered D-dimer  PT/OT eval   MRI brain negative for any acute intracranial abnormality.  CT angiogram head and neck negative for large vessel occlusion.  Appreciate input from cardiology, nuclear stress test today does not show any ischemia or infarct and shows a EF of 57%. Echo with the same.   Patient has been cleared by cardiology.    As needed meclizine.  We can increase the dose of meclizine to 25 mg 3 times daily if the current dose is not working.  Waiting for placement at this point.     History of bilateral mastectomy due to breast cancer  History of cholecystectomy  Continue home dose of anastrozole.     Anxiety  Depression  Neuropathy  Continue with Neurontin, trazodone, Effexor.  Follow up with outpatient psychiatry    Back pain  Arthritis  Chronic, follows with ortho outpatient  Continue NSAIDs, tylenol, lidoderm prn pain.     GERD  Continue with Protonix.    Discharge Exam:  Patient seen and examined by me on discharge day.  Pertinent Findings:  Patient Vitals for the past 24 hrs:   BP Temp Temp src Pulse Resp SpO2   08/17/24 0813 123/74 98.2 °F (36.8 °C) Oral 56 18 94 %   08/17/24 0242 (!) 140/62 98.4 °F (36.9 °C) Oral 71 18 --   08/16/24 2103 -- -- -- -- -- 98 %   08/16/24 2053 (!) 140/62 98.4 °F (36.9 °C) Oral 71 18 98 %       Gen:    Not in distress  Chest: Clear lungs  CVS:   Regular rhythm.  No edema  Abd:  Soft, not distended, not tender  Neuro: awake, moving all exts    Discharge/Recent Laboratory Results:  No results for input(s): \"NA\", \"K\", \"CL\", \"CO2\", \"BUN\", \"CREATININE\", \"GLUCOSE\", \"CALCIUM\", \"PHOS\", \"MG\" in the last 72 hours.  No results for input(s): \"HGB\", \"HCT\", \"WBC\", \"PLT\" in the last 72 hours.    Discharge Medications:     Medication List        START taking these medications      aspirin 81 MG chewable tablet  Take 1 tablet by mouth daily     lidocaine 5 %  Commonly known as: LIDODERM  Place 1 patch onto the skin daily as needed for Pain 12 
needed for Pain 12 hours on, 12 hours off.     meclizine 12.5 MG tablet  Commonly known as: ANTIVERT  Take 1 tablet by mouth 3 times daily as needed for Dizziness            CHANGE how you take these medications      * acetaminophen 500 MG tablet  Commonly known as: TYLENOL  What changed: Another medication with the same name was added. Make sure you understand how and when to take each.     * acetaminophen 500 MG tablet  Commonly known as: TYLENOL  Take 1 tablet by mouth 4 times daily as needed for Pain  What changed: You were already taking a medication with the same name, and this prescription was added. Make sure you understand how and when to take each.           * This list has 2 medication(s) that are the same as other medications prescribed for you. Read the directions carefully, and ask your doctor or other care provider to review them with you.                CONTINUE taking these medications      anastrozole 1 MG tablet  Commonly known as: ARIMIDEX     buPROPion 300 MG extended release tablet  Commonly known as: WELLBUTRIN XL     Calcium 600+D3 Plus Minerals 600-800 MG-UNIT Chew     cetirizine 10 MG tablet  Commonly known as: ZYRTEC     diphenhydrAMINE-APAP (sleep)  MG tablet  Commonly known as: TYLENOL PM EXTRA STRENGTH     gabapentin 100 MG capsule  Commonly known as: NEURONTIN     hydroCHLOROthiazide 25 MG tablet  Commonly known as: HYDRODIURIL     ibuprofen 200 MG tablet  Commonly known as: ADVIL;MOTRIN     Jardiance 25 MG tablet  Generic drug: empagliflozin     omeprazole 20 MG delayed release capsule  Commonly known as: PRILOSEC     polyethylene glycol 17 GM/SCOOP powder  Commonly known as: GLYCOLAX     Prolia 60 MG/ML Sosy SC injection  Generic drug: denosumab     traZODone 50 MG tablet  Commonly known as: DESYREL     * venlafaxine 150 MG extended release capsule  Commonly known as: EFFEXOR XR     * venlafaxine 37.5 MG tablet  Commonly known as: EFFEXOR     vitamin C 500 MG tablet  Commonly

## 2024-09-05 ENCOUNTER — HOSPITAL ENCOUNTER (OUTPATIENT)
Facility: HOSPITAL | Age: 69
Discharge: HOME | End: 2024-09-08
Attending: INTERNAL MEDICINE
Payer: MEDICARE

## 2024-09-05 DIAGNOSIS — C50.911 MALIGNANT NEOPLASM OF RIGHT FEMALE BREAST, UNSPECIFIED ESTROGEN RECEPTOR STATUS, UNSPECIFIED SITE OF BREAST (HCC): ICD-10-CM

## 2024-09-05 DIAGNOSIS — M86.9 SAPHO SYNDROME (HCC): ICD-10-CM

## 2024-09-05 DIAGNOSIS — M81.8 IDIOPATHIC OSTEOPOROSIS: ICD-10-CM

## 2024-09-05 DIAGNOSIS — M85.80 SAPHO SYNDROME (HCC): ICD-10-CM

## 2024-09-05 DIAGNOSIS — L40.3 SAPHO SYNDROME (HCC): ICD-10-CM

## 2024-09-05 DIAGNOSIS — L70.9 SAPHO SYNDROME (HCC): ICD-10-CM

## 2024-09-05 DIAGNOSIS — Z79.811 USE OF AROMATASE INHIBITORS: ICD-10-CM

## 2024-09-05 DIAGNOSIS — M65.9 SAPHO SYNDROME (HCC): ICD-10-CM

## 2024-09-05 PROCEDURE — 77080 DXA BONE DENSITY AXIAL: CPT

## 2024-10-09 ENCOUNTER — OFFICE VISIT (OUTPATIENT)
Age: 69
End: 2024-10-09
Payer: MEDICARE

## 2024-10-09 VITALS
HEIGHT: 60 IN | BODY MASS INDEX: 44.17 KG/M2 | RESPIRATION RATE: 18 BRPM | HEART RATE: 73 BPM | SYSTOLIC BLOOD PRESSURE: 118 MMHG | DIASTOLIC BLOOD PRESSURE: 80 MMHG | OXYGEN SATURATION: 98 % | WEIGHT: 225 LBS

## 2024-10-09 DIAGNOSIS — R42 DIZZINESS: ICD-10-CM

## 2024-10-09 DIAGNOSIS — M50.30 DDD (DEGENERATIVE DISC DISEASE), CERVICAL: ICD-10-CM

## 2024-10-09 DIAGNOSIS — R26.89 BALANCE DISORDER: ICD-10-CM

## 2024-10-09 DIAGNOSIS — Z09 HOSPITAL DISCHARGE FOLLOW-UP: Primary | ICD-10-CM

## 2024-10-09 PROCEDURE — 99214 OFFICE O/P EST MOD 30 MIN: CPT | Performed by: NURSE PRACTITIONER

## 2024-10-09 PROCEDURE — 1111F DSCHRG MED/CURRENT MED MERGE: CPT | Performed by: NURSE PRACTITIONER

## 2024-10-09 PROCEDURE — 1123F ACP DISCUSS/DSCN MKR DOCD: CPT | Performed by: NURSE PRACTITIONER

## 2024-10-09 RX ORDER — LAMOTRIGINE 25 MG/1
25 TABLET ORAL DAILY
COMMUNITY
Start: 2024-09-22

## 2024-10-09 RX ORDER — ONDANSETRON 4 MG/1
4 TABLET, ORALLY DISINTEGRATING ORAL EVERY 8 HOURS PRN
COMMUNITY
Start: 2024-09-05

## 2024-10-09 ASSESSMENT — PATIENT HEALTH QUESTIONNAIRE - PHQ9
SUM OF ALL RESPONSES TO PHQ QUESTIONS 1-9: 0
SUM OF ALL RESPONSES TO PHQ QUESTIONS 1-9: 0
1. LITTLE INTEREST OR PLEASURE IN DOING THINGS: NOT AT ALL
SUM OF ALL RESPONSES TO PHQ QUESTIONS 1-9: 0
SUM OF ALL RESPONSES TO PHQ QUESTIONS 1-9: 2
SUM OF ALL RESPONSES TO PHQ QUESTIONS 1-9: 0
1. LITTLE INTEREST OR PLEASURE IN DOING THINGS: SEVERAL DAYS
SUM OF ALL RESPONSES TO PHQ QUESTIONS 1-9: 2
SUM OF ALL RESPONSES TO PHQ QUESTIONS 1-9: 2
SUM OF ALL RESPONSES TO PHQ9 QUESTIONS 1 & 2: 0
SUM OF ALL RESPONSES TO PHQ9 QUESTIONS 1 & 2: 2
2. FEELING DOWN, DEPRESSED OR HOPELESS: NOT AT ALL
2. FEELING DOWN, DEPRESSED OR HOPELESS: SEVERAL DAYS
SUM OF ALL RESPONSES TO PHQ QUESTIONS 1-9: 2

## 2024-10-09 ASSESSMENT — ENCOUNTER SYMPTOMS
VOMITING: 0
BACK PAIN: 1
NAUSEA: 1

## 2024-10-09 NOTE — PROGRESS NOTES
Chief Complaint   Patient presents with    Follow-Up from [facility] 8/9-8/19/24 - went in for vertigo - chest pain and stroke like sx - all tests negative - continues to have dizziness and vertigo spells      1. Have you been to the ER, urgent care clinic since your last visit?  Hospitalized since your last visit? No     2. Have you seen or consulted any other health care providers outside of the [facility] since your last visit?  Include any pap smears or colon screening.  No     
Extremities revealed no edema and had full range of motion of joints.    Psych:  Good mood and bright affect.    NEUROLOGICAL EXAMINATION:     Mental Status:   Alert and oriented to person, place, and time with recent and remote memory intact.  Attention span and concentration are normal. Clear speech. Fund of knowledge preserved.    Cranial Nerves:   PERRLA.  Visual fields were full  EOM: no evidence of nystagmus  Facial sensation:  normal and symmetric  Facial motor: normal and symmetric, no facial droop noted.  Hearing intact  SCM strength intact  Tongue: midline without fasciculations    Motor Examination: Normal tone. 5/5 muscle strength in bilateral upper and lower extremities. No cogwheel rigidity.  No muscle wasting, no twitching or fasciculation noted.      Sensory exam:  Normal throughout to light touch in BUE and BLE.    Coordination:   Finger to nose and rapid arm movement testing was normal.  No resting or intention tremor.  Negative Romberg, negative pronator drift.      Gait and Station:  Slow with SPC.  Normal arm swing.      Reflexes:  DTRs 2+ in bilateral biceps, brachioradialis, patella and ankle.  No clonus noted.    ASSESSMENT AND PLAN     Diagnosis Orders   1. Hospital discharge follow-up  [place] DISCHARGE MEDS RECONCILED W/ CURRENT OUTPATIENT MED LIST      2. DDD (degenerative disc disease), cervical  External Referral To Home Health      3. Dizziness  External Referral To Home Health      4. Balance disorder  External Referral To Home Health        1.  Hospital discharge follow-up: Hospital discharge summary reviewed, all diagnostics, labs as well as medications were reconciled.  2.  Cervical DDD: Head and neck CTA results reviewed with patient, she is to continue participating with home health physical therapy.  Patient notes intermittent symptoms in her neck however her pain is fairly well-controlled.  3.  Dizziness: Patient is to continue working with home health physical therapist, I have

## 2024-11-07 ENCOUNTER — OFFICE VISIT (OUTPATIENT)
Age: 69
End: 2024-11-07

## 2024-11-07 VITALS
HEIGHT: 60 IN | BODY MASS INDEX: 43.68 KG/M2 | OXYGEN SATURATION: 96 % | RESPIRATION RATE: 18 BRPM | WEIGHT: 222.5 LBS | DIASTOLIC BLOOD PRESSURE: 76 MMHG | SYSTOLIC BLOOD PRESSURE: 124 MMHG | HEART RATE: 87 BPM

## 2024-11-07 DIAGNOSIS — M79.18 MYOFASCIAL PAIN SYNDROME: ICD-10-CM

## 2024-11-07 DIAGNOSIS — M50.30 DDD (DEGENERATIVE DISC DISEASE), CERVICAL: Primary | ICD-10-CM

## 2024-11-07 DIAGNOSIS — G44.86 CERVICOGENIC HEADACHE: ICD-10-CM

## 2024-11-07 DIAGNOSIS — F43.9 STRESS: ICD-10-CM

## 2024-11-07 DIAGNOSIS — R42 DIZZINESS: ICD-10-CM

## 2024-11-07 DIAGNOSIS — R26.89 BALANCE DISORDER: ICD-10-CM

## 2024-11-07 RX ORDER — DULOXETIN HYDROCHLORIDE 30 MG/1
60 CAPSULE, DELAYED RELEASE ORAL DAILY
COMMUNITY
Start: 2024-10-15

## 2024-11-07 NOTE — PROGRESS NOTES
Chief Complaint   Patient presents with    Dizziness     Follow up for dizziness and vertigo - saw ENT and was advised to come back to neurology - ENT suggested topamax or amitriptyline to help with anxiety        1. Have you been to the ER, urgent care clinic since your last visit?  Hospitalized since your last visit? No     2. Have you seen or consulted any other health care providers outside of the [facility] since your last visit?  Include any pap smears or colon screening. Yes [facility]     
extra-axial fluid collections. There is no Chiari or syrinx. The  pituitary and infundibulum are grossly unremarkable. There is no skull base  mass. Cerebellopontine angles are grossly unremarkable. The major intracranial  vascular flow-voids are unremarkable. The cavernous sinuses are symmetric. Optic  chiasm and infundibulum grossly unremarkable. Orbits are grossly symmetric.  Dural venous sinuses are grossly patent.  The mastoid air cells are well pneumatized and clear.    Impression  Normal MRI of the brain.    There is no intracranial mass, hemorrhage or evidence of acute infarction.  No acute intracranial process is demonstrated.        Electronically signed by CARMEN DANIEL        PHYSICAL EXAMINATION:    Vitals:    11/07/24 1341   BP: 124/76   Site: Left Upper Arm   Position: Sitting   Cuff Size: Large Adult   Pulse: 87   Resp: 18   SpO2: 96%   Weight: 100.9 kg (222 lb 8 oz)   Height: 1.524 m (5')       General:  Well defined, nourished, and well groomed individual in no acute distress.    Neck: Supple, slightly tender, decreased range of motion.   Musculoskeletal:  Extremities revealed no edema and had full range of motion of joints.  Bilateral traps taut and tender.  Psych:  Good mood and bright affect.    NEUROLOGICAL EXAMINATION:     Mental Status:   Alert and oriented to person, place, and time with recent and remote memory intact.  Attention span and concentration are normal. Clear speech. Fund of knowledge preserved.     Cranial Nerves:   PERRLA.  Visual fields were full  EOM: no evidence of nystagmus  Facial sensation:  normal and symmetric  Facial motor: normal and symmetric, no facial droop noted.  Hearing intact  SCM strength intact  Tongue: midline without fasciculations    Motor Examination: Normal tone. 5/5 muscle strength in bilateral upper  extremities. No cogwheel rigidity.  No muscle wasting, no twitching or fasciculation noted.      Sensory exam:  Normal throughout to light touch in bilateral

## 2024-12-17 ENCOUNTER — TELEPHONE (OUTPATIENT)
Age: 69
End: 2024-12-17

## 2024-12-17 NOTE — TELEPHONE ENCOUNTER
Patient states Maria Esther Kay wanted her to follow up with her psychiatrist prior to prescribing one of the three medications below.     Amitriptyline  Nortriptyline  Topirimate    Patient states she spoke with her psychiatrist and is ready to speak with Maria Esther Kay about what is best. She states \"it's a lot to go over.\"       Please call her back at .   Thank you.

## 2024-12-18 DIAGNOSIS — G44.86 CERVICOGENIC HEADACHE: Primary | ICD-10-CM

## 2024-12-18 RX ORDER — NORTRIPTYLINE HYDROCHLORIDE 10 MG/1
10 CAPSULE ORAL NIGHTLY
Qty: 30 CAPSULE | Refills: 3 | Status: SHIPPED | OUTPATIENT
Start: 2024-12-18

## 2024-12-27 NOTE — TELEPHONE ENCOUNTER
Patient states she followed Maria Esther Kay's instructions and still feels dizziness and would like to request one of the three medications listed earlier. Please call her back at . Thank you.

## 2025-01-09 DIAGNOSIS — G44.86 CERVICOGENIC HEADACHE: ICD-10-CM

## 2025-01-09 RX ORDER — NORTRIPTYLINE HYDROCHLORIDE 10 MG/1
CAPSULE ORAL
Qty: 90 CAPSULE | Refills: 2 | OUTPATIENT
Start: 2025-01-09

## 2025-01-09 RX ORDER — NORTRIPTYLINE HYDROCHLORIDE 10 MG/1
10 CAPSULE ORAL NIGHTLY
Qty: 90 CAPSULE | Refills: 1 | Status: SHIPPED | OUTPATIENT
Start: 2025-01-09

## 2025-01-09 NOTE — TELEPHONE ENCOUNTER
Medication was sent on 12/18/24. Patient asking for a 90 day refill.   Last seen:11/7/24    Future appt: 4/14/25

## 2025-01-29 ENCOUNTER — TELEPHONE (OUTPATIENT)
Age: 70
End: 2025-01-29

## 2025-01-29 NOTE — TELEPHONE ENCOUNTER
Faxed auth to release medical information ( signed and dated by patient) to Dr Leelee Martines. Purpose is to give Dr Martines permission to discuss patients case with Maria Esther Kay NP.    Forwarding to you - you should be able to call Dr Martines now-    Received confirmation that fax went through successfully

## 2025-03-31 ENCOUNTER — TRANSCRIBE ORDERS (OUTPATIENT)
Facility: HOSPITAL | Age: 70
End: 2025-03-31

## 2025-03-31 DIAGNOSIS — M51.360 DISCOGENIC LOW BACK PAIN: Primary | ICD-10-CM

## 2025-04-18 ENCOUNTER — HOSPITAL ENCOUNTER (OUTPATIENT)
Facility: HOSPITAL | Age: 70
Discharge: HOME | End: 2025-04-21
Payer: MEDICARE

## 2025-04-18 DIAGNOSIS — M51.360 DISCOGENIC LOW BACK PAIN: ICD-10-CM

## 2025-04-18 PROCEDURE — 72148 MRI LUMBAR SPINE W/O DYE: CPT
